# Patient Record
Sex: FEMALE | Race: WHITE | NOT HISPANIC OR LATINO
[De-identification: names, ages, dates, MRNs, and addresses within clinical notes are randomized per-mention and may not be internally consistent; named-entity substitution may affect disease eponyms.]

---

## 2017-01-20 ENCOUNTER — APPOINTMENT (OUTPATIENT)
Dept: NEUROSURGERY | Facility: CLINIC | Age: 82
End: 2017-01-20

## 2017-01-20 VITALS
HEART RATE: 72 BPM | SYSTOLIC BLOOD PRESSURE: 148 MMHG | HEIGHT: 63.5 IN | TEMPERATURE: 97.8 F | WEIGHT: 120 LBS | OXYGEN SATURATION: 98 % | DIASTOLIC BLOOD PRESSURE: 82 MMHG | BODY MASS INDEX: 21 KG/M2 | RESPIRATION RATE: 16 BRPM

## 2017-01-20 RX ORDER — SERTRALINE HYDROCHLORIDE 50 MG/1
50 TABLET, FILM COATED ORAL DAILY
Refills: 0 | Status: ACTIVE | COMMUNITY

## 2017-02-06 ENCOUNTER — APPOINTMENT (OUTPATIENT)
Dept: PHYSICAL MEDICINE AND REHAB | Facility: CLINIC | Age: 82
End: 2017-02-06

## 2017-02-06 DIAGNOSIS — Z74.09 OTHER REDUCED MOBILITY: ICD-10-CM

## 2017-02-06 DIAGNOSIS — I62.03 NONTRAUMATIC CHRONIC SUBDURAL HEMORRHAGE: ICD-10-CM

## 2017-02-10 ENCOUNTER — APPOINTMENT (OUTPATIENT)
Dept: OPHTHALMOLOGY | Facility: CLINIC | Age: 82
End: 2017-02-10

## 2017-02-15 PROBLEM — Z74.09 IMPAIRED FUNCTIONAL MOBILITY, BALANCE, AND ENDURANCE: Status: ACTIVE | Noted: 2017-02-15

## 2017-05-15 ENCOUNTER — APPOINTMENT (OUTPATIENT)
Dept: OPHTHALMOLOGY | Facility: CLINIC | Age: 82
End: 2017-05-15

## 2017-05-15 DIAGNOSIS — S05.02XA INJURY OF CONJUNCTIVA AND CORNEAL ABRASION W/OUT FOREIGN BODY, LEFT EYE, INITIAL ENCOUNTER: ICD-10-CM

## 2017-05-17 ENCOUNTER — APPOINTMENT (OUTPATIENT)
Dept: OPHTHALMOLOGY | Facility: CLINIC | Age: 82
End: 2017-05-17

## 2017-05-19 ENCOUNTER — APPOINTMENT (OUTPATIENT)
Dept: OPHTHALMOLOGY | Facility: CLINIC | Age: 82
End: 2017-05-19

## 2017-05-22 ENCOUNTER — RX RENEWAL (OUTPATIENT)
Age: 82
End: 2017-05-22

## 2017-05-24 ENCOUNTER — APPOINTMENT (OUTPATIENT)
Dept: OPHTHALMOLOGY | Facility: CLINIC | Age: 82
End: 2017-05-24

## 2017-06-02 ENCOUNTER — APPOINTMENT (OUTPATIENT)
Dept: OPHTHALMOLOGY | Facility: CLINIC | Age: 82
End: 2017-06-02

## 2017-06-15 ENCOUNTER — APPOINTMENT (OUTPATIENT)
Dept: DERMATOLOGY | Facility: CLINIC | Age: 82
End: 2017-06-15

## 2017-06-15 VITALS — WEIGHT: 128 LBS | BODY MASS INDEX: 21.85 KG/M2 | HEIGHT: 64 IN

## 2017-06-15 DIAGNOSIS — L81.4 OTHER MELANIN HYPERPIGMENTATION: ICD-10-CM

## 2017-06-15 DIAGNOSIS — Z87.891 PERSONAL HISTORY OF NICOTINE DEPENDENCE: ICD-10-CM

## 2017-06-15 DIAGNOSIS — Z78.9 OTHER SPECIFIED HEALTH STATUS: ICD-10-CM

## 2017-06-15 DIAGNOSIS — Z00.00 ENCOUNTER FOR GENERAL ADULT MEDICAL EXAMINATION W/OUT ABNORMAL FINDINGS: ICD-10-CM

## 2017-06-15 DIAGNOSIS — Z85.820 PERSONAL HISTORY OF MALIGNANT MELANOMA OF SKIN: ICD-10-CM

## 2017-06-15 DIAGNOSIS — S05.32XA OCULAR LACERATION W/OUT PROLAPSE OR LOSS OF INTRAOCULAR TISSUE, LEFT EYE, INITIAL ENCOUNTER: ICD-10-CM

## 2017-06-15 DIAGNOSIS — L82.1 OTHER SEBORRHEIC KERATOSIS: ICD-10-CM

## 2017-06-15 RX ORDER — ERYTHROMYCIN 5 MG/G
5 OINTMENT OPHTHALMIC
Qty: 1 | Refills: 3 | Status: DISCONTINUED | COMMUNITY
Start: 2017-05-15 | End: 2017-06-15

## 2017-06-15 RX ORDER — PREDNISOLONE ACETATE 10 MG/ML
1 SUSPENSION/ DROPS OPHTHALMIC
Qty: 11 | Refills: 1 | Status: DISCONTINUED | COMMUNITY
Start: 2017-06-02 | End: 2017-06-15

## 2017-06-15 RX ORDER — MOXIFLOXACIN HYDROCHLORIDE 5 MG/ML
0.5 SOLUTION/ DROPS OPHTHALMIC 4 TIMES DAILY
Qty: 1 | Refills: 2 | Status: DISCONTINUED | COMMUNITY
Start: 2017-05-15 | End: 2017-06-15

## 2017-06-16 ENCOUNTER — APPOINTMENT (OUTPATIENT)
Dept: OPHTHALMOLOGY | Facility: CLINIC | Age: 82
End: 2017-06-16

## 2017-06-19 ENCOUNTER — APPOINTMENT (OUTPATIENT)
Dept: OPHTHALMOLOGY | Facility: CLINIC | Age: 82
End: 2017-06-19

## 2017-07-14 ENCOUNTER — APPOINTMENT (OUTPATIENT)
Dept: OPHTHALMOLOGY | Facility: CLINIC | Age: 82
End: 2017-07-14

## 2017-07-14 DIAGNOSIS — H43.811 VITREOUS DEGENERATION, RIGHT EYE: ICD-10-CM

## 2017-08-11 ENCOUNTER — APPOINTMENT (OUTPATIENT)
Dept: OPHTHALMOLOGY | Facility: CLINIC | Age: 82
End: 2017-08-11
Payer: MEDICARE

## 2017-08-11 PROCEDURE — 92012 INTRM OPH EXAM EST PATIENT: CPT

## 2017-08-23 ENCOUNTER — RX RENEWAL (OUTPATIENT)
Age: 82
End: 2017-08-23

## 2017-08-25 ENCOUNTER — APPOINTMENT (OUTPATIENT)
Dept: OPHTHALMOLOGY | Facility: CLINIC | Age: 82
End: 2017-08-25
Payer: MEDICARE

## 2017-08-25 PROCEDURE — 92012 INTRM OPH EXAM EST PATIENT: CPT

## 2017-08-28 ENCOUNTER — APPOINTMENT (OUTPATIENT)
Dept: OPHTHALMOLOGY | Facility: CLINIC | Age: 82
End: 2017-08-28
Payer: MEDICARE

## 2017-08-28 PROCEDURE — 92012 INTRM OPH EXAM EST PATIENT: CPT

## 2017-08-31 ENCOUNTER — EMERGENCY (EMERGENCY)
Facility: HOSPITAL | Age: 82
LOS: 0 days | Discharge: HOME | End: 2017-08-31
Attending: EMERGENCY MEDICINE | Admitting: EMERGENCY MEDICINE
Payer: MEDICARE

## 2017-08-31 ENCOUNTER — APPOINTMENT (OUTPATIENT)
Dept: OPHTHALMOLOGY | Facility: CLINIC | Age: 82
End: 2017-08-31
Payer: MEDICARE

## 2017-08-31 VITALS
SYSTOLIC BLOOD PRESSURE: 149 MMHG | OXYGEN SATURATION: 99 % | DIASTOLIC BLOOD PRESSURE: 86 MMHG | TEMPERATURE: 98 F | HEART RATE: 69 BPM | WEIGHT: 130.07 LBS | HEIGHT: 64 IN

## 2017-08-31 DIAGNOSIS — Z90.89 ACQUIRED ABSENCE OF OTHER ORGANS: ICD-10-CM
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
## 2017-08-31 DIAGNOSIS — S40.819A ABRASION OF UNSPECIFIED UPPER ARM, INITIAL ENCOUNTER: ICD-10-CM

## 2017-08-31 DIAGNOSIS — Z94.7 CORNEAL TRANSPLANT STATUS: ICD-10-CM

## 2017-08-31 DIAGNOSIS — S50.811A ABRASION OF RIGHT FOREARM, INITIAL ENCOUNTER: ICD-10-CM

## 2017-08-31 DIAGNOSIS — H91.90 UNSPECIFIED HEARING LOSS, UNSPECIFIED EAR: ICD-10-CM

## 2017-08-31 DIAGNOSIS — M19.90 UNSPECIFIED OSTEOARTHRITIS, UNSPECIFIED SITE: ICD-10-CM
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
## 2017-08-31 DIAGNOSIS — I10 ESSENTIAL (PRIMARY) HYPERTENSION: ICD-10-CM

## 2017-08-31 DIAGNOSIS — H26.40 UNSPECIFIED SECONDARY CATARACT: ICD-10-CM

## 2017-08-31 DIAGNOSIS — Z96.651 PRESENCE OF RIGHT ARTIFICIAL KNEE JOINT: ICD-10-CM

## 2017-08-31 DIAGNOSIS — I35.0 NONRHEUMATIC AORTIC (VALVE) STENOSIS: ICD-10-CM

## 2017-08-31 PROCEDURE — 92012 INTRM OPH EXAM EST PATIENT: CPT

## 2017-08-31 PROCEDURE — 99283 EMERGENCY DEPT VISIT LOW MDM: CPT

## 2017-08-31 NOTE — ED STATDOCS - ATTENDING CONTRIBUTION TO CARE
I, Ivan Dc MD, personally saw the patient with the PA, and completed the key components of the history and physical exam. I then discussed the management plan with the PA.

## 2017-08-31 NOTE — ED STATDOCS - MEDICAL DECISION MAKING DETAILS
86 y/o F presents w/ right forearm skin tear s/p minor blunt injury, no associated neurovascular injury. Plan for local wound care. Family to check with PCP to confirm TD status up to date.

## 2017-08-31 NOTE — ED STATDOCS - PHYSICAL EXAMINATION
Right forearm nontender. Positive arthritic changes to right hand/finger joints. No focal weakness. Sensation intact.   Right forearm dorsal surface irregular superficial skin tear, no active bleeding.

## 2017-08-31 NOTE — ED STATDOCS - CONDUCTED A DETAILED DISCUSSION WITH PATIENT AND/OR GUARDIAN REGARDING, MDM
radiology results/need for outpatient follow-up need for outpatient follow-up/return to ED if symptoms worsen, persist or questions arise

## 2017-08-31 NOTE — ED STATDOCS - PROGRESS NOTE DETAILS
JABARI Contreras:   Patient has been seen, evaluated and orders have been written by the attending in intake. Patient is stable.  I will follow up the results of orders written and I will continue to evaluate/observe the patient.  Keysha Contreras PA-C Wound cleansed with H2O2 and Bacitracin dressing applied.  Keysha Contreras PA-C

## 2017-08-31 NOTE — ED STATDOCS - OBJECTIVE STATEMENT
84 y/o F w/ PMHx of presents to ED c/o R arm injury about 1 hour ago. Pt hit her arm on corner of door while coming out of bathroom at audiology office today.. Denies ASA or blood thinners, numbness, tingling, weakness s/p injury. As per family at bedside, believes last TD was less than 5 years ago.    MDM 84 y/o F presents w/ right forearm skin tear s/p minor blunt injury, no associated neurovascular injury. Plan for local wound care. Family to check with PCP to confirm TD status up to date.

## 2017-08-31 NOTE — ED STATDOCS - PMH
After cataract, right eye    Aortic Insufficiency    Arthritis    Hard of Hearing    HTN (Hypertension)    Hx of tonsillectomy    Post corneal transplant    Urinary urgency

## 2017-09-07 ENCOUNTER — APPOINTMENT (OUTPATIENT)
Dept: OPHTHALMOLOGY | Facility: CLINIC | Age: 82
End: 2017-09-07
Payer: MEDICARE

## 2017-09-07 DIAGNOSIS — H43.392 OTHER VITREOUS OPACITIES, LEFT EYE: ICD-10-CM

## 2017-09-07 DIAGNOSIS — S05.02XA INJURY OF CONJUNCTIVA AND CORNEAL ABRASION W/OUT FOREIGN BODY, LEFT EYE, INITIAL ENCOUNTER: ICD-10-CM

## 2017-09-07 PROCEDURE — 92132 CPTRZD OPH DX IMG ANT SGM: CPT

## 2017-09-07 PROCEDURE — 92012 INTRM OPH EXAM EST PATIENT: CPT

## 2017-09-07 PROCEDURE — 92285 EXTERNAL OCULAR PHOTOGRAPHY: CPT

## 2017-09-07 RX ORDER — ACYCLOVIR 400 MG/1
400 TABLET ORAL TWICE DAILY
Qty: 60 | Refills: 3 | Status: ACTIVE | COMMUNITY
Start: 2017-09-07 | End: 1900-01-01

## 2017-09-20 ENCOUNTER — APPOINTMENT (OUTPATIENT)
Dept: OPHTHALMOLOGY | Facility: CLINIC | Age: 82
End: 2017-09-20

## 2017-09-22 ENCOUNTER — APPOINTMENT (OUTPATIENT)
Dept: OPHTHALMOLOGY | Facility: CLINIC | Age: 82
End: 2017-09-22
Payer: MEDICARE

## 2017-09-22 DIAGNOSIS — B00.52 HERPESVIRAL KERATITIS: ICD-10-CM

## 2017-09-22 PROCEDURE — 92012 INTRM OPH EXAM EST PATIENT: CPT

## 2017-10-13 ENCOUNTER — OUTPATIENT (OUTPATIENT)
Dept: OUTPATIENT SERVICES | Facility: HOSPITAL | Age: 82
LOS: 1 days | End: 2017-10-13
Payer: MEDICARE

## 2017-10-13 ENCOUNTER — RESULT REVIEW (OUTPATIENT)
Age: 82
End: 2017-10-13

## 2017-10-13 ENCOUNTER — APPOINTMENT (OUTPATIENT)
Dept: RADIOLOGY | Facility: HOSPITAL | Age: 82
End: 2017-10-13
Payer: MEDICARE

## 2017-10-13 DIAGNOSIS — G31.84 MILD COGNITIVE IMPAIRMENT OF UNCERTAIN OR UNKNOWN ETIOLOGY: ICD-10-CM

## 2017-10-13 LAB
APPEARANCE CSF: CLEAR — SIGNIFICANT CHANGE UP
APPEARANCE SPUN FLD: COLORLESS — SIGNIFICANT CHANGE UP
COLOR CSF: SIGNIFICANT CHANGE UP
GLUCOSE CSF-MCNC: 64 MG/DL — SIGNIFICANT CHANGE UP (ref 40–70)
GRAM STN FLD: SIGNIFICANT CHANGE UP
NEUTROPHILS # CSF: 0 % — SIGNIFICANT CHANGE UP (ref 0–6)
NRBC NFR CSF: <1 — SIGNIFICANT CHANGE UP (ref 0–5)
PROT CSF-MCNC: 21 MG/DL — SIGNIFICANT CHANGE UP (ref 15–45)
RBC # CSF: 61 /UL — HIGH (ref 0–0)
SPECIMEN SOURCE: SIGNIFICANT CHANGE UP
TUBE TYPE: SIGNIFICANT CHANGE UP

## 2017-10-13 PROCEDURE — 88108 CYTOPATH CONCENTRATE TECH: CPT

## 2017-10-13 PROCEDURE — 77003 FLUOROGUIDE FOR SPINE INJECT: CPT | Mod: 26

## 2017-10-13 PROCEDURE — 87799 DETECT AGENT NOS DNA QUANT: CPT

## 2017-10-13 PROCEDURE — 77003 FLUOROGUIDE FOR SPINE INJECT: CPT

## 2017-10-13 PROCEDURE — 84166 PROTEIN E-PHORESIS/URINE/CSF: CPT

## 2017-10-13 PROCEDURE — 87070 CULTURE OTHR SPECIMN AEROBIC: CPT

## 2017-10-13 PROCEDURE — 82945 GLUCOSE OTHER FLUID: CPT

## 2017-10-13 PROCEDURE — 87476 LYME DIS DNA AMP PROBE: CPT

## 2017-10-13 PROCEDURE — 84157 ASSAY OF PROTEIN OTHER: CPT

## 2017-10-13 PROCEDURE — 62270 DX LMBR SPI PNXR: CPT

## 2017-10-13 PROCEDURE — 87205 SMEAR GRAM STAIN: CPT

## 2017-10-13 PROCEDURE — 88108 CYTOPATH CONCENTRATE TECH: CPT | Mod: 26

## 2017-10-13 PROCEDURE — 89051 BODY FLUID CELL COUNT: CPT

## 2017-10-16 LAB
CULTURE RESULTS: NO GROWTH — SIGNIFICANT CHANGE UP
NON-GYNECOLOGICAL CYTOLOGY STUDY: SIGNIFICANT CHANGE UP
SPECIMEN SOURCE: SIGNIFICANT CHANGE UP

## 2017-10-17 LAB
B BURGDOR DNA SPEC QL NAA+PROBE: NEGATIVE — SIGNIFICANT CHANGE UP
PROT CSF-MCNC: 21 MG/DL — SIGNIFICANT CHANGE UP (ref 15–45)
SOURCE VZV: SIGNIFICANT CHANGE UP
VZV DNA CSF NAA+PROBE-LOG#: SIGNIFICANT CHANGE UP
VZV PCR: SIGNIFICANT CHANGE UP

## 2017-10-25 LAB
ALBUMIN CSF-MCNC: 12 MG/DL — LOW (ref 14–25)
IGG CSF-MCNC: 1.3 MG/DL — SIGNIFICANT CHANGE UP
IGG SYNTH RATE SER+CSF CALC-MRATE: SIGNIFICANT CHANGE UP MG/DAY
IGG/ALB CLEAR SER+CSF-RTO: SIGNIFICANT CHANGE UP
IGG/ALB CSF: 0.11 RATIO — SIGNIFICANT CHANGE UP

## 2017-10-26 LAB
OLIGOCLONAL BANDS CSF ELPH-IMP: SIGNIFICANT CHANGE UP
OLIGOCLONAL BANDS CSF ELPH-IMP: SIGNIFICANT CHANGE UP

## 2017-11-01 LAB
MISCELLANEOUS TEST NAME: SIGNIFICANT CHANGE UP
MISCELLANEOUS, NORMALS: SIGNIFICANT CHANGE UP
MISCELLANEOUS, RESULT: SIGNIFICANT CHANGE UP

## 2017-11-06 ENCOUNTER — INPATIENT (INPATIENT)
Facility: HOSPITAL | Age: 82
LOS: 6 days | Discharge: HOME HEALTH | End: 2017-11-13
Attending: INTERNAL MEDICINE | Admitting: INTERNAL MEDICINE
Payer: MEDICARE

## 2017-11-06 VITALS
WEIGHT: 149.91 LBS | HEART RATE: 98 BPM | HEIGHT: 67 IN | RESPIRATION RATE: 16 BRPM | SYSTOLIC BLOOD PRESSURE: 126 MMHG | TEMPERATURE: 98 F | OXYGEN SATURATION: 97 % | DIASTOLIC BLOOD PRESSURE: 90 MMHG

## 2017-11-06 LAB
ALBUMIN SERPL ELPH-MCNC: 4.1 G/DL — SIGNIFICANT CHANGE UP (ref 3.3–5)
ALP SERPL-CCNC: 83 U/L — SIGNIFICANT CHANGE UP (ref 40–120)
ALT FLD-CCNC: 23 U/L — SIGNIFICANT CHANGE UP (ref 12–78)
ANION GAP SERPL CALC-SCNC: 10 MMOL/L — SIGNIFICANT CHANGE UP (ref 5–17)
APTT BLD: 28.2 SEC — SIGNIFICANT CHANGE UP (ref 27.5–37.4)
AST SERPL-CCNC: 44 U/L — HIGH (ref 15–37)
BASOPHILS # BLD AUTO: 0.1 K/UL — SIGNIFICANT CHANGE UP (ref 0–0.2)
BASOPHILS NFR BLD AUTO: 0.8 % — SIGNIFICANT CHANGE UP (ref 0–2)
BILIRUB SERPL-MCNC: 0.6 MG/DL — SIGNIFICANT CHANGE UP (ref 0.2–1.2)
BUN SERPL-MCNC: 23 MG/DL — SIGNIFICANT CHANGE UP (ref 7–23)
CALCIUM SERPL-MCNC: 9.7 MG/DL — SIGNIFICANT CHANGE UP (ref 8.5–10.1)
CHLORIDE SERPL-SCNC: 107 MMOL/L — SIGNIFICANT CHANGE UP (ref 96–108)
CO2 SERPL-SCNC: 25 MMOL/L — SIGNIFICANT CHANGE UP (ref 22–31)
CREAT SERPL-MCNC: 1.07 MG/DL — SIGNIFICANT CHANGE UP (ref 0.5–1.3)
EOSINOPHIL # BLD AUTO: 0.1 K/UL — SIGNIFICANT CHANGE UP (ref 0–0.5)
EOSINOPHIL NFR BLD AUTO: 0.9 % — SIGNIFICANT CHANGE UP (ref 0–6)
GLUCOSE SERPL-MCNC: 90 MG/DL — SIGNIFICANT CHANGE UP (ref 70–99)
HCT VFR BLD CALC: 38.7 % — SIGNIFICANT CHANGE UP (ref 34.5–45)
HGB BLD-MCNC: 13.1 G/DL — SIGNIFICANT CHANGE UP (ref 11.5–15.5)
INR BLD: 1.06 RATIO — SIGNIFICANT CHANGE UP (ref 0.88–1.16)
LYMPHOCYTES # BLD AUTO: 1.6 K/UL — SIGNIFICANT CHANGE UP (ref 1–3.3)
LYMPHOCYTES # BLD AUTO: 18.2 % — SIGNIFICANT CHANGE UP (ref 13–44)
MCHC RBC-ENTMCNC: 32 PG — SIGNIFICANT CHANGE UP (ref 27–34)
MCHC RBC-ENTMCNC: 33.8 GM/DL — SIGNIFICANT CHANGE UP (ref 32–36)
MCV RBC AUTO: 94.7 FL — SIGNIFICANT CHANGE UP (ref 80–100)
MONOCYTES # BLD AUTO: 0.7 K/UL — SIGNIFICANT CHANGE UP (ref 0–0.9)
MONOCYTES NFR BLD AUTO: 8.2 % — SIGNIFICANT CHANGE UP (ref 2–14)
NEUTROPHILS # BLD AUTO: 6.4 K/UL — SIGNIFICANT CHANGE UP (ref 1.8–7.4)
NEUTROPHILS NFR BLD AUTO: 71.9 % — SIGNIFICANT CHANGE UP (ref 43–77)
PLATELET # BLD AUTO: 258 K/UL — SIGNIFICANT CHANGE UP (ref 150–400)
POTASSIUM SERPL-MCNC: 3.8 MMOL/L — SIGNIFICANT CHANGE UP (ref 3.5–5.3)
POTASSIUM SERPL-SCNC: 3.8 MMOL/L — SIGNIFICANT CHANGE UP (ref 3.5–5.3)
PROT SERPL-MCNC: 7.9 GM/DL — SIGNIFICANT CHANGE UP (ref 6–8.3)
PROTHROM AB SERPL-ACNC: 11.5 SEC — SIGNIFICANT CHANGE UP (ref 9.8–12.7)
RBC # BLD: 4.09 M/UL — SIGNIFICANT CHANGE UP (ref 3.8–5.2)
RBC # FLD: 12 % — SIGNIFICANT CHANGE UP (ref 10.3–14.5)
SODIUM SERPL-SCNC: 142 MMOL/L — SIGNIFICANT CHANGE UP (ref 135–145)
WBC # BLD: 9 K/UL — SIGNIFICANT CHANGE UP (ref 3.8–10.5)
WBC # FLD AUTO: 9 K/UL — SIGNIFICANT CHANGE UP (ref 3.8–10.5)

## 2017-11-06 PROCEDURE — 70450 CT HEAD/BRAIN W/O DYE: CPT | Mod: 26

## 2017-11-06 PROCEDURE — 71010: CPT | Mod: 26

## 2017-11-06 PROCEDURE — 99285 EMERGENCY DEPT VISIT HI MDM: CPT

## 2017-11-06 RX ORDER — INFLUENZA VIRUS VACCINE 15; 15; 15; 15 UG/.5ML; UG/.5ML; UG/.5ML; UG/.5ML
0.5 SUSPENSION INTRAMUSCULAR ONCE
Qty: 0 | Refills: 0 | Status: DISCONTINUED | OUTPATIENT
Start: 2017-11-06 | End: 2017-11-07

## 2017-11-06 RX ORDER — ONDANSETRON 8 MG/1
4 TABLET, FILM COATED ORAL EVERY 6 HOURS
Qty: 0 | Refills: 0 | Status: DISCONTINUED | OUTPATIENT
Start: 2017-11-06 | End: 2017-11-13

## 2017-11-06 RX ORDER — HALOPERIDOL DECANOATE 100 MG/ML
1 INJECTION INTRAMUSCULAR EVERY 4 HOURS
Qty: 0 | Refills: 0 | Status: DISCONTINUED | OUTPATIENT
Start: 2017-11-06 | End: 2017-11-13

## 2017-11-06 RX ORDER — PREDNISOLONE SODIUM PHOSPHATE 1 %
1 DROPS OPHTHALMIC (EYE)
Qty: 0 | Refills: 0 | Status: DISCONTINUED | OUTPATIENT
Start: 2017-11-06 | End: 2017-11-13

## 2017-11-06 RX ORDER — TRAZODONE HCL 50 MG
50 TABLET ORAL AT BEDTIME
Qty: 0 | Refills: 0 | Status: DISCONTINUED | OUTPATIENT
Start: 2017-11-06 | End: 2017-11-13

## 2017-11-06 RX ORDER — ACETAMINOPHEN 500 MG
650 TABLET ORAL EVERY 6 HOURS
Qty: 0 | Refills: 0 | Status: DISCONTINUED | OUTPATIENT
Start: 2017-11-06 | End: 2017-11-13

## 2017-11-06 RX ORDER — LIPASE/PROTEASE/AMYLASE 16-48-48K
2 CAPSULE,DELAYED RELEASE (ENTERIC COATED) ORAL
Qty: 0 | Refills: 0 | Status: DISCONTINUED | OUTPATIENT
Start: 2017-11-06 | End: 2017-11-13

## 2017-11-06 RX ORDER — AMLODIPINE BESYLATE 2.5 MG/1
5 TABLET ORAL DAILY
Qty: 0 | Refills: 0 | Status: DISCONTINUED | OUTPATIENT
Start: 2017-11-06 | End: 2017-11-13

## 2017-11-06 RX ORDER — LANOLIN ALCOHOL/MO/W.PET/CERES
10 CREAM (GRAM) TOPICAL AT BEDTIME
Qty: 0 | Refills: 0 | Status: DISCONTINUED | OUTPATIENT
Start: 2017-11-06 | End: 2017-11-06

## 2017-11-06 RX ORDER — DIVALPROEX SODIUM 500 MG/1
500 TABLET, DELAYED RELEASE ORAL ONCE
Qty: 0 | Refills: 0 | Status: COMPLETED | OUTPATIENT
Start: 2017-11-06 | End: 2017-11-06

## 2017-11-06 RX ORDER — SERTRALINE 25 MG/1
100 TABLET, FILM COATED ORAL DAILY
Qty: 0 | Refills: 0 | Status: DISCONTINUED | OUTPATIENT
Start: 2017-11-06 | End: 2017-11-13

## 2017-11-06 RX ORDER — PANTOPRAZOLE SODIUM 20 MG/1
40 TABLET, DELAYED RELEASE ORAL
Qty: 0 | Refills: 0 | Status: DISCONTINUED | OUTPATIENT
Start: 2017-11-06 | End: 2017-11-13

## 2017-11-06 RX ORDER — LANOLIN ALCOHOL/MO/W.PET/CERES
9 CREAM (GRAM) TOPICAL AT BEDTIME
Qty: 0 | Refills: 0 | Status: DISCONTINUED | OUTPATIENT
Start: 2017-11-06 | End: 2017-11-13

## 2017-11-06 RX ORDER — QUETIAPINE FUMARATE 200 MG/1
25 TABLET, FILM COATED ORAL EVERY 6 HOURS
Qty: 0 | Refills: 0 | Status: DISCONTINUED | OUTPATIENT
Start: 2017-11-06 | End: 2017-11-08

## 2017-11-06 RX ORDER — HALOPERIDOL DECANOATE 100 MG/ML
2.5 INJECTION INTRAMUSCULAR ONCE
Qty: 0 | Refills: 0 | Status: COMPLETED | OUTPATIENT
Start: 2017-11-06 | End: 2017-11-06

## 2017-11-06 RX ORDER — SODIUM CHLORIDE 9 MG/ML
1000 INJECTION INTRAMUSCULAR; INTRAVENOUS; SUBCUTANEOUS ONCE
Qty: 0 | Refills: 0 | Status: COMPLETED | OUTPATIENT
Start: 2017-11-06 | End: 2017-11-06

## 2017-11-06 RX ORDER — HEPARIN SODIUM 5000 [USP'U]/ML
5000 INJECTION INTRAVENOUS; SUBCUTANEOUS EVERY 12 HOURS
Qty: 0 | Refills: 0 | Status: DISCONTINUED | OUTPATIENT
Start: 2017-11-06 | End: 2017-11-13

## 2017-11-06 RX ADMIN — Medication 1 MILLIGRAM(S): at 15:54

## 2017-11-06 RX ADMIN — DIVALPROEX SODIUM 500 MILLIGRAM(S): 500 TABLET, DELAYED RELEASE ORAL at 20:22

## 2017-11-06 RX ADMIN — HALOPERIDOL DECANOATE 2.5 MILLIGRAM(S): 100 INJECTION INTRAMUSCULAR at 15:54

## 2017-11-06 RX ADMIN — SODIUM CHLORIDE 1000 MILLILITER(S): 9 INJECTION INTRAMUSCULAR; INTRAVENOUS; SUBCUTANEOUS at 15:54

## 2017-11-06 NOTE — H&P ADULT - HISTORY OF PRESENT ILLNESS
84 y/o F with PMHx of HTN, Lyme's disease, babesiosis and ehrlichiosis presents to the ED with two daughters and aide at bedside for AMS. On June 18, 2016, pt was diagnosed with the 3 tick borne diseases. Pt was in ICU, treated with quinine. hematoma.  Pt got better and then went [place] rehab. Pt was brought home on July 16, 2016. Since pt has been home, pt has been fine with some cognitive deficits according to daughter. Pt sees Dr. Thayer for psychiatric care and was placed on Seroquel 25mg 5 times a day. This past weekend, pt's daughter states that pt is more agitated. Pt has also not been sleeping. Pt had EEG and labs done on Thursday to test for the tick borne illnesses. Pt had UA done and it was unremarkable. Pt also MRI done 3 weeks ago which was normal. Aide states that pt has been more difficult to feed. Dr. Thayer, psychiatrist. Pt had abrupt changes in her MS not consistent with worsening dementia; daughter thinks this is neurolyme; I will consult psych for better med management and ID re: poss neurolyme

## 2017-11-06 NOTE — H&P ADULT - ASSESSMENT
Delirium  - pt with no h/o cognitive impairment very educated and active who had major set back after infection with all 3 tick born ilnesses  - it is very possible that this could be sec to the prev infection, and not impossible neurolyme  - consult psych re: drug regimen to allow her to return home and ID re: poss neurolyme  - for now I will use ativan, prn; pt needs 1:1 she is very aggressive when awke at risk to harm self and others  Case d/w daughter  Hanny is not formulary fam to bring from home

## 2017-11-06 NOTE — ED ADULT NURSE NOTE - OBJECTIVE STATEMENT
Pt biba for increased confusion and ams. Daughter reported that mom   was stricken with tick borne diseases x 2 in June 2016. Pt has been altered since then and required 24 hr  care.

## 2017-11-06 NOTE — ED PROVIDER NOTE - MEDICAL DECISION MAKING DETAILS
Pt with AMS for the past 2 years, worsening this past weekend despite psychiatrist adjusting Seroquel dose. Plan for labs, UA and CT to r/o acute process.

## 2017-11-06 NOTE — H&P ADULT - NSHPPHYSICALEXAM_GEN_ALL_CORE
· CONSTITUTIONAL: somnolent s/p haldol and ativan  · ENMT: Airway patent, Nasal mucosa clear. Mouth with normal mucosa.   · HEAD: Head atraumatic, normal cephalic shape.  · EYES: Clear bilaterally, pupils equal, round and reactive to light. Dry oral mucosae  · CARDIAC: Normal rate, regular rhythm.  Heart sounds S1, S2.  No murmurs, rubs or gallops.  · RESPIRATORY: Breath sounds clear and equal bilaterally.  · GASTROINTESTINAL: Abdomen soft, non-tender, no guarding.  · MUSCULOSKELETAL: Spine appears normal, range of motion is not limited, no muscle or joint tenderness. Moves all extremities.  · NEUROLOGICAL: Pt confused, disoriented and unaware.  · SKIN: Skin normal color for race, warm, dry and intact. No evidence of rash.  · PSYCHIATRIC: pt confused, disoriented and unaware earlier; now she is sedated

## 2017-11-06 NOTE — PATIENT PROFILE ADULT. - ABILITY TO HEAR (WITH HEARING AID OR HEARING APPLIANCE IF NORMALLY USED):
rudy hearing aids at home/Mildly to Moderately Impaired: difficulty hearing in some environments or speaker may need to increase volume or speak distinctly

## 2017-11-06 NOTE — ED PROVIDER NOTE - MUSCULOSKELETAL, MLM
Spine appears normal, range of motion is not limited, no muscle or joint tenderness. Moves all extremities.

## 2017-11-06 NOTE — ED ADULT NURSE REASSESSMENT NOTE - NS ED NURSE REASSESS COMMENT FT1
Daughter refused to have her mom straight cath at this time  because she had ua last week that was fine

## 2017-11-06 NOTE — ED ADULT TRIAGE NOTE - CHIEF COMPLAINT QUOTE
pt with "2 tick born diseases now with altered mental status" pt alert but speaking in jargon at triage.

## 2017-11-06 NOTE — H&P ADULT - NSHPLABSRESULTS_GEN_ALL_CORE
13.1   9.0   )-----------( 258      ( 06 Nov 2017 15:38 )             38.7   11-06    142  |  107  |  23  ----------------------------<  90  3.8   |  25  |  1.07    Ca    9.7      06 Nov 2017 15:38    TPro  7.9  /  Alb  4.1  /  TBili  0.6  /  DBili  x   /  AST  44<H>  /  ALT  23  /  AlkPhos  83  11-06

## 2017-11-06 NOTE — ED PROVIDER NOTE - OBJECTIVE STATEMENT
84 y/o F with PMHx of HTN, Lyme's disease, babesiosis and ehrlichiosis presents to the ED with two daughters and aide at bedside for AMS. On June 18, 2016, pt was diagnosed with the 3 tick borne diseases. Pt was in ICU, treated with quinine. While in ICU pt had subdermal hematoma. Pt was then treated at Neurosurgery Department at [facility]. Pt was treated with steroids. Pt got better and then went [place] rehab. Pt was brought home on July 16, 2016. Since pt has been home, pt has been fine with some cognitive deficits according to daughter. Pt sees Dr. Purvis for psychiatric care and was placed on Seroquel 25mg 5 times a day. This past weekend, pt's daughter states that pt is more agitated. Pt has also not been sleeping. Pt had EEG and labs done on Thursday to test for the tick borne illnesses. Pt had UA done and it was unremarkable. Pt also MRI done 3 weeks ago which was normal. Aide states that pt has been more difficult to feed. Dr. Purvis, psychiatrist. 86 y/o F with PMHx of HTN, Lyme's disease, babesiosis and ehrlichiosis presents to the ED with two daughters and aide at bedside for AMS. On June 18, 2016, pt was diagnosed with the 3 tick borne diseases. Pt was in ICU, treated with quinine. While in ICU pt had subdermal hematoma. Pt was then treated at Neurosurgery Department at [facility]. Pt was treated with steroids. Pt got better and then went [place] re[facility]. Pt was brought home on July 16, 2016. Since pt has been home, pt has been fine with some cognitive deficits according to daughter. Pt sees Dr. Thayer for psychiatric care and was placed on Seroquel 25mg 5 times a day. This past weekend, pt's daughter states that pt is more agitated. Pt has also not been sleeping. Pt had EEG and labs done on Thursday to test for the tick borne illnesses. Pt had UA done and it was unremarkable. Pt also MRI done 3 weeks ago which was normal. Aide states that pt has been more difficult to feed. Dr. Thayer, psychiatrist.

## 2017-11-06 NOTE — ED PROVIDER NOTE - CARE PLAN
Principal Discharge DX:	Altered mental status  Secondary Diagnosis:	Dementia  Secondary Diagnosis:	Agitation

## 2017-11-06 NOTE — PATIENT PROFILE ADULT. - VISION (WITH CORRECTIVE LENSES IF THE PATIENT USUALLY WEARS THEM):
Partially impaired: cannot see medication labels or newsprint, but can see obstacles in path, and the surrounding layout; can count fingers at arm's length/distance/glasses

## 2017-11-07 DIAGNOSIS — F02.81 DEMENTIA IN OTHER DISEASES CLASSIFIED ELSEWHERE, UNSPECIFIED SEVERITY, WITH BEHAVIORAL DISTURBANCE: ICD-10-CM

## 2017-11-07 RX ADMIN — Medication 1 DROP(S): at 02:38

## 2017-11-07 RX ADMIN — Medication 1 DROP(S): at 05:40

## 2017-11-07 RX ADMIN — QUETIAPINE FUMARATE 25 MILLIGRAM(S): 200 TABLET, FILM COATED ORAL at 02:39

## 2017-11-07 RX ADMIN — Medication 2 CAPSULE(S): at 12:55

## 2017-11-07 RX ADMIN — QUETIAPINE FUMARATE 25 MILLIGRAM(S): 200 TABLET, FILM COATED ORAL at 05:44

## 2017-11-07 RX ADMIN — Medication 1 DROP(S): at 17:00

## 2017-11-07 RX ADMIN — HEPARIN SODIUM 5000 UNIT(S): 5000 INJECTION INTRAVENOUS; SUBCUTANEOUS at 05:39

## 2017-11-07 RX ADMIN — QUETIAPINE FUMARATE 25 MILLIGRAM(S): 200 TABLET, FILM COATED ORAL at 17:00

## 2017-11-07 RX ADMIN — PANTOPRAZOLE SODIUM 40 MILLIGRAM(S): 20 TABLET, DELAYED RELEASE ORAL at 05:44

## 2017-11-07 RX ADMIN — Medication 2 CAPSULE(S): at 08:13

## 2017-11-07 RX ADMIN — Medication 1 DROP(S): at 12:55

## 2017-11-07 RX ADMIN — AMLODIPINE BESYLATE 5 MILLIGRAM(S): 2.5 TABLET ORAL at 05:40

## 2017-11-07 RX ADMIN — Medication 2 CAPSULE(S): at 17:31

## 2017-11-07 RX ADMIN — SERTRALINE 100 MILLIGRAM(S): 25 TABLET, FILM COATED ORAL at 12:55

## 2017-11-07 RX ADMIN — QUETIAPINE FUMARATE 25 MILLIGRAM(S): 200 TABLET, FILM COATED ORAL at 12:55

## 2017-11-07 RX ADMIN — Medication 9 MILLIGRAM(S): at 02:38

## 2017-11-07 NOTE — BEHAVIORAL HEALTH ASSESSMENT NOTE - NSBHREFERDETAILS_PSY_A_CORE_FT
· Chief Complaint: The patient is a 85y Female complaining of altered mental status.  · HPI Objective Statement: 84 y/o F with PMHx of HTN, Lyme's disease, babesiosis and ehrlichiosis presents to the ED with two daughters and aide at bedside for AMS. On June 18, 2016, pt was diagnosed with the 3 tick borne diseases. Pt was in ICU, treated with quinine. While in ICU pt had subdermal hematoma. Pt was then treated at Neurosurgery Department at [facility]. Pt was treated with steroids. Pt got better and then went [place] rehab. Pt was brought home on July 16, 2016. Since pt has been home, pt has been fine with some cognitive deficits according to daughter. Pt sees Dr. Thayer for psychiatric care and was placed on Seroquel 25mg 5 times a day. This past weekend, pt's daughter states that pt is more agitated. Pt has also not been sleeping. Pt had EEG and labs done on Thursday to test for the tick borne illnesses. Pt had UA done and it was unremarkable. Pt also MRI done 3 weeks ago which was normal. Aide states that pt has been more difficult to feed. Dr. Thayer, psychiatrist.

## 2017-11-07 NOTE — CONSULT NOTE ADULT - SUBJECTIVE AND OBJECTIVE BOX
Patient is a 85y old  Female who presents with a chief complaint of AMS (06 Nov 2017 20:14)    HPI:  86 y/o Female with h/o HTN, Lyme's disease + babesiosis + ehrlichiosis (all in June 2016, treated with doxycycline PO, clindamycin + quinine, then atovaquone + azithromycin and RBC exchange transfusion) was admitted on 11/6 with two daughters and aide at bedside for AMS. Over last week PTA, pt's daughter states that pt was more agitated wit hinsomnia. Pt had EEG and labs done on Thursday to test for the tick borne illnesses.  An MRI head done 3 weeks PTA was reported normal. Aide states that pt has been more difficult to feed. Her psychiatrist felt that her abrupt changes in her MS are not consistent with worsening dementia and is concerned about neurological Lyme disease.      On June 18, 2016, pt was diagnosed with the 3 tick borne diseases. Pt was in ICU, treated with quinine. hematoma.  Pt got better and then went [place] re[facility]. Pt was brought home on July 16, 2016. Since pt has been home, pt has been fine with some cognitive deficits according to daughter. Pt sees Dr. Thayer for psychiatric care and was placed on Seroquel 25mg 5 times a day.       PMH: as above  PSH: as above  Meds: per reconciliation sheet, noted below  MEDICATIONS  (STANDING):  amLODIPine   Tablet 5 milliGRAM(s) Oral daily  amylase/lipase/protease  (CREON 12,000 Units) 2 Capsule(s) Oral three times a day with meals  heparin  Injectable 5000 Unit(s) SubCutaneous every 12 hours  melatonin 9 milliGRAM(s) Oral at bedtime  pantoprazole    Tablet 40 milliGRAM(s) Oral before breakfast  prednisoLONE acetate 1% Suspension 1 Drop(s) Left EYE four times a day  QUEtiapine 25 milliGRAM(s) Oral every 6 hours  sertraline 100 milliGRAM(s) Oral daily  traZODone 50 milliGRAM(s) Oral at bedtime    MEDICATIONS  (PRN):  acetaminophen   Tablet 650 milliGRAM(s) Oral every 6 hours PRN fever pain  haloperidol    Injectable 1 milliGRAM(s) IntraMuscular every 4 hours PRN agitation  LORazepam   Injectable 1 milliGRAM(s) IntraMuscular every 6 hours PRN Agitation  ondansetron Injectable 4 milliGRAM(s) IV Push every 6 hours PRN Nausea    Allergies    No Known Allergies    Intolerances      Social: no smoking, no alcohol, no illegal drugs; no recent travel, no exposure to TB  FAMILY HISTORY:  No pertinent family history in first degree relatives    ROS: the patient dis confused; unobtainable    Vital Signs Last 24 Hrs  T(C): 37.2 (07 Nov 2017 11:06), Max: 37.2 (07 Nov 2017 11:06)  T(F): 98.9 (07 Nov 2017 11:06), Max: 98.9 (07 Nov 2017 11:06)  HR: 69 (07 Nov 2017 11:06) (69 - 98)  BP: 113/71 (07 Nov 2017 11:06) (113/71 - 152/72)  BP(mean): --  RR: 17 (07 Nov 2017 11:06) (16 - 17)  SpO2: 98% (07 Nov 2017 11:06) (97% - 98%)  Daily Height in cm: 170.18 (06 Nov 2017 14:32)    Daily     PE:    Constitutional: frail looking  HEENT: NC/AT, EOMI, PERRLA  Neck: supple  Back: no tenderness  Respiratory: clear  Cardiovascular: S1S2 regular, no murmurs  Abdomen: soft, not tender, not distended, positive BS  Genitourinary: no LN palpable  Rectal: deferred  Musculoskeletal: no muscle tenderness, no joint swelling or tenderness  Extremities: no pedal edema  Neurological: confused, moving all extremities  Skin: no rashes    Labs:                        13.1   9.0   )-----------( 258      ( 06 Nov 2017 15:38 )             38.7     11-06    142  |  107  |  23  ----------------------------<  90  3.8   |  25  |  1.07    Ca    9.7      06 Nov 2017 15:38    TPro  7.9  /  Alb  4.1  /  TBili  0.6  /  DBili  x   /  AST  44<H>  /  ALT  23  /  AlkPhos  83  11-06     LIVER FUNCTIONS - ( 06 Nov 2017 15:38 )  Alb: 4.1 g/dL / Pro: 7.9 gm/dL / ALK PHOS: 83 U/L / ALT: 23 U/L / AST: 44 U/L / GGT: x           June 2016: Lyme Ig G positive; Lyme IgM negative      Radiology:    < from: CT Head No Cont (11.06.17 @ 16:17) >  Chronic small vessel ischemic changes. No parenchymal contusion,   hemorrhage or extra-axial collection.    No CT evidence of an acute territorial infarction.    < end of copied text >      Advanced directives addressed: full resuscitation Patient is a 85y old  Female who presents with a chief complaint of AMS (06 Nov 2017 20:14)    HPI:  84 y/o Female with h/o HTN, Lyme's disease + babesiosis + ehrlichiosis (all in June 2016, treated with doxycycline PO, clindamycin + quinine, then atovaquone + azithromycin and RBC exchange transfusion) was admitted on 11/6 with two daughters and aide at bedside for AMS. Over last week PTA, pt's daughter states that pt was more agitated wit hinsomnia. Pt had EEG and labs done on Thursday to test for the tick borne illnesses.  An MRI head done 3 weeks PTA was reported normal. Aide states that pt has been more difficult to feed. Her psychiatrist felt that her abrupt changes in her MS are not consistent with worsening dementia and is concerned about neurological Lyme disease.      On June 18, 2016, pt was diagnosed with the 3 tick borne diseases. Pt was in ICU, treated with quinine. hematoma.  Pt got better and then went [place] rehab. Pt was brought home on July 16, 2016. Since pt has been home, pt has been fine with some cognitive deficits according to daughter. Pt sees Dr. Thayer for psychiatric care and was placed on Seroquel 25mg 5 times a day.       PMH: as above  PSH: as above  Meds: per reconciliation sheet, noted below  MEDICATIONS  (STANDING):  amLODIPine   Tablet 5 milliGRAM(s) Oral daily  amylase/lipase/protease  (CREON 12,000 Units) 2 Capsule(s) Oral three times a day with meals  heparin  Injectable 5000 Unit(s) SubCutaneous every 12 hours  melatonin 9 milliGRAM(s) Oral at bedtime  pantoprazole    Tablet 40 milliGRAM(s) Oral before breakfast  prednisoLONE acetate 1% Suspension 1 Drop(s) Left EYE four times a day  QUEtiapine 25 milliGRAM(s) Oral every 6 hours  sertraline 100 milliGRAM(s) Oral daily  traZODone 50 milliGRAM(s) Oral at bedtime    MEDICATIONS  (PRN):  acetaminophen   Tablet 650 milliGRAM(s) Oral every 6 hours PRN fever pain  haloperidol    Injectable 1 milliGRAM(s) IntraMuscular every 4 hours PRN agitation  LORazepam   Injectable 1 milliGRAM(s) IntraMuscular every 6 hours PRN Agitation  ondansetron Injectable 4 milliGRAM(s) IV Push every 6 hours PRN Nausea    Allergies    No Known Allergies    Intolerances      Social: no smoking, no alcohol, no illegal drugs; no recent travel, no exposure to TB  FAMILY HISTORY:  No pertinent family history in first degree relatives    ROS: the patient dis confused; unobtainable    Vital Signs Last 24 Hrs  T(C): 37.2 (07 Nov 2017 11:06), Max: 37.2 (07 Nov 2017 11:06)  T(F): 98.9 (07 Nov 2017 11:06), Max: 98.9 (07 Nov 2017 11:06)  HR: 69 (07 Nov 2017 11:06) (69 - 98)  BP: 113/71 (07 Nov 2017 11:06) (113/71 - 152/72)  BP(mean): --  RR: 17 (07 Nov 2017 11:06) (16 - 17)  SpO2: 98% (07 Nov 2017 11:06) (97% - 98%)  Daily Height in cm: 170.18 (06 Nov 2017 14:32)    Daily     PE:    Constitutional: frail looking  HEENT: NC/AT, EOMI, PERRLA  Neck: supple  Back: no tenderness  Respiratory: clear  Cardiovascular: S1S2 regular, no murmurs  Abdomen: soft, not tender, not distended, positive BS  Genitourinary: no LN palpable  Rectal: deferred  Musculoskeletal: no muscle tenderness, no joint swelling or tenderness  Extremities: no pedal edema  Neurological: confused, moving all extremities  Skin: no rashes    Labs:                        13.1   9.0   )-----------( 258      ( 06 Nov 2017 15:38 )             38.7     11-06    142  |  107  |  23  ----------------------------<  90  3.8   |  25  |  1.07    Ca    9.7      06 Nov 2017 15:38    TPro  7.9  /  Alb  4.1  /  TBili  0.6  /  DBili  x   /  AST  44<H>  /  ALT  23  /  AlkPhos  83  11-06     LIVER FUNCTIONS - ( 06 Nov 2017 15:38 )  Alb: 4.1 g/dL / Pro: 7.9 gm/dL / ALK PHOS: 83 U/L / ALT: 23 U/L / AST: 44 U/L / GGT: x           June 2016: Lyme Ig G positive; Lyme IgM negative  Nov 2, 2017 Lyme IgG positive; Lyme IgM negative; no change in AB pattern from June 2016    Radiology:    < from: CT Head No Cont (11.06.17 @ 16:17) >  Chronic small vessel ischemic changes. No parenchymal contusion,   hemorrhage or extra-axial collection.    No CT evidence of an acute territorial infarction.    < end of copied text >      Advanced directives addressed: full resuscitation

## 2017-11-07 NOTE — PROGRESS NOTE ADULT - SUBJECTIVE AND OBJECTIVE BOX
86 y/o F with PMHx of HTN, Lyme's disease, babesiosis and ehrlichiosis presents to the ED with two daughters and aide at bedside for AMS. On June 18, 2016, pt was diagnosed with the 3 tick borne diseases. Pt was in ICU, treated with quinine. hematoma.  Pt got better and then went [place] [facility]. Pt was brought home on July 16, 2016. Since pt has been home, pt has been fine with some cognitive deficits according to daughter. Pt sees Dr. Thayer for psychiatric care and was placed on Seroquel 25mg 5 times a day. This past weekend, pt's daughter states that pt is more agitated. Pt has also not been sleeping. Pt had EEG and labs done on Thursday to test for the tick borne illnesses. Pt had UA done and it was unremarkable. Pt also MRI done 3 weeks ago which was normal. Aide states that pt has been more difficult to feed. Dr. Thayer, psychiatrist. Pt had abrupt changes in her MS not consistent with worsening dementia; daughter thinks this is neurolyme; I will consult psych for better med management and ID re: poss     11/7: Mrs. Bhagat is v calm today and nursing had no events to report; appropriate, pleasant, remembers why she is here and acknowledges she is been having a hard time with delirium and hallucinations    Vital Signs Last 24 Hrs  T(C): 37.2 (07 Nov 2017 11:06), Max: 37.2 (07 Nov 2017 11:06)  T(F): 98.9 (07 Nov 2017 11:06), Max: 98.9 (07 Nov 2017 11:06)  HR: 69 (07 Nov 2017 11:06) (69 - 98)  BP: 113/71 (07 Nov 2017 11:06) (113/71 - 152/72)  BP(mean): --  RR: 17 (07 Nov 2017 11:06) (16 - 17)  SpO2: 98% (07 Nov 2017 11:06) (97% - 98%)    Physical Exam: ·  	· ENMT: Airway patent, Nasal mucosa clear. Mouth with normal mucosa.   	· HEAD: Head atraumatic, normal cephalic shape.  	· EYES: Clear bilaterally, pupils equal, round and reactive to light. Dry oral mucosae  	· CARDIAC: Normal rate, regular rhythm.  Heart sounds S1, S2.  No murmurs, rubs or gallops.  	· RESPIRATORY: Breath sounds clear and equal bilaterally.  	· GASTROINTESTINAL: Abdomen soft, non-tender, no guarding.  	· MUSCULOSKELETAL: Spine appears normal, range of motion is not limited, no muscle or joint tenderness. Moves all extremities.  	· NEUROLOGICAL: awake, alert and oriented; mild tremor upper extr- per pt chronic; ambulating to bathroom with no deficit  	· SKIN: Skin normal color for race, warm, dry and intact. No evidence of rash.      A/P:    Delirium  - pt with no h/o cognitive impairment very educated and active who had major set back after infection with all 3 tick born illnesses  - it is very possible that this could be sec to the prev infection, and not impossible neurolyme  - consult psych re: drug regimen to allow her to return home and ID re: poss neurolyme  - waiting for psych and ID eval; haldol and ativan seems to work great for her ( probably haldol)    Hanny is not formulary fam to bring from home

## 2017-11-07 NOTE — BEHAVIORAL HEALTH ASSESSMENT NOTE - NSBHCHARTREVIEWVS_PSY_A_CORE FT
ICU Vital Signs Last 24 Hrs  T(C): 37.2 (07 Nov 2017 11:06), Max: 37.2 (07 Nov 2017 11:06)  T(F): 98.9 (07 Nov 2017 11:06), Max: 98.9 (07 Nov 2017 11:06)  HR: 69 (07 Nov 2017 11:06) (69 - 90)  BP: 113/71 (07 Nov 2017 11:06) (113/71 - 152/72)  BP(mean): --  ABP: --  ABP(mean): --  RR: 17 (07 Nov 2017 11:06) (16 - 17)  SpO2: 98% (07 Nov 2017 11:06) (98% - 98%)

## 2017-11-07 NOTE — CONSULT NOTE ADULT - ASSESSMENT
84 y/o Female with h/o HTN, Lyme's disease + babesiosis + ehrlichiosis (all in June 2016, treated with doxycycline PO, clindamycin + quinine, then atovaquone + azithromycin and RBC exchange transfusion) was admitted on 11/6 with two daughters and aide at bedside for AMS. Over last week PTA, pt's daughter states that pt was more agitated with insomnia. Pt had EEG and labs done on Thursday to test for the tick borne illnesses.  An MRI head done 3 weeks PTA was reported normal. Aide states that pt has been more difficult to feed. Her psychiatrist felt that her abrupt changes in her MS are not consistent with worsening dementia and is concerned about neurological Lyme disease.    1. Encephalopathy. Dementia.   -concern about neurological Lyme disease was raised  -old medical records from June 2016 reviewed; the patient received appropriate therapy for tick borne illnesses  -no recent tick exposure reported by daughter 84 y/o Female with h/o HTN, Lyme's disease + babesiosis + ehrlichiosis (all in June 2016, treated with doxycycline PO, clindamycin + quinine, then atovaquone + azithromycin and RBC exchange transfusion) was admitted on 11/6 with two daughters and aide at bedside for AMS. Over last week PTA, pt's daughter states that pt was more agitated with insomnia. Pt had EEG and labs done on Thursday to test for the tick borne illnesses.  An MRI head done 3 weeks PTA was reported normal. Aide states that pt has been more difficult to feed. Her psychiatrist felt that her abrupt changes in her MS are not consistent with worsening dementia and is concerned about neurological Lyme disease.    1. Encephalopathy. Dementia.   -concern about neurological Lyme disease was raised  -old medical records from June 2016 reviewed; the patient received appropriate therapy for tick borne illnesses  -no recent tick exposure reported by daughter  -she had LP performed on 10/13: CSF Lyme PCR is negative 84 y/o Female with h/o HTN, Lyme's disease + babesiosis + ehrlichiosis (all in June 2016, treated with doxycycline PO, clindamycin + quinine, then atovaquone + azithromycin and RBC exchange transfusion) was admitted on 11/6 with two daughters and aide at bedside for AMS. Over last week PTA, pt's daughter states that pt was more agitated with insomnia. Pt had EEG and labs done on Thursday to test for the tick borne illnesses.  An MRI head done 3 weeks PTA was reported normal. Aide states that pt has been more difficult to feed. Her psychiatrist felt that her abrupt changes in her MS are not consistent with worsening dementia and is concerned about neurological Lyme disease.    1. Encephalopathy. Dementia.   -concern about neurological Lyme disease was raised  -old medical records from June 2016 reviewed; the patient received appropriate therapy for tick borne illnesses  -no recent tick exposure reported by daughter  -she had LP performed on 10/13: CSF Lyme PCR is negative  -doubt neurological Lyme in [place] of negative CSF Lyme PCR test  -daughter states that the patient is under the care of Dr. Jong Leung, a Lyme specialist doctor from [facility]; tel [phone]  -she asked me to call this physician since she states that her mother has neurological Lyme and would like her mother to receive abx therapy with minocycline  -left message with Dr. Leung to discuss case since apparently his practice is specializing in Lyme disease  -observe off abx for now

## 2017-11-07 NOTE — BEHAVIORAL HEALTH ASSESSMENT NOTE - SUMMARY
Spoke with Dr Thayer, Pt psychiatrist who feels Pt has progressive frontal dementia which he is managing symptoms for, however ultimately feels she need long Term Placement.  He is recommending Depakote as last attempt to manage Pt agitation on out  Pt basis,   and is requesting initiating  while  in pt,  with Valproic Levels to follow after establishing LFT's wnl.  Case discussed with Dr Ferraro who agree to start at 250 mg bid   Change Seroquel to 50 tid   Hold psych meds, Seroquel and Trazadone if QTC is > 500  Please obtain u/a, (none on chart) to r/o Delirium  or UTI   and baseline EKG.

## 2017-11-07 NOTE — BEHAVIORAL HEALTH ASSESSMENT NOTE - NSBHCONSULTMEDS_PSY_A_CORE FT
change Seroquel to 50 mg tid,   Depakote 250 bid with Valproic level 1x week  Continue Zoloft 100 qd

## 2017-11-07 NOTE — BEHAVIORAL HEALTH ASSESSMENT NOTE - NSBHCHARTREVIEWLAB_PSY_A_CORE FT
11-06    142  |  107  |  23  ----------------------------<  90  3.8   |  25  |  1.07    Ca    9.7      06 Nov 2017 15:38    TPro  7.9  /  Alb  4.1  /  TBili  0.6  /  DBili  x   /  AST  44<H>  /  ALT  23  /  AlkPhos  83  11-06  CBC Full  -  ( 06 Nov 2017 15:38 )  WBC Count : 9.0 K/uL  Hemoglobin : 13.1 g/dL  Hematocrit : 38.7 %  Platelet Count - Automated : 258 K/uL  Mean Cell Volume : 94.7 fl  Mean Cell Hemoglobin : 32.0 pg  Mean Cell Hemoglobin Concentration : 33.8 gm/dL  Auto Neutrophil # : 6.4 K/uL  Auto Lymphocyte # : 1.6 K/uL  Auto Monocyte # : 0.7 K/uL  Auto Eosinophil # : 0.1 K/uL  Auto Basophil # : 0.1 K/uL  Auto Neutrophil % : 71.9 %  Auto Lymphocyte % : 18.2 %  Auto Monocyte % : 8.2 %  Auto Eosinophil % : 0.9 %  Auto Basophil % : 0.8 %

## 2017-11-07 NOTE — BEHAVIORAL HEALTH ASSESSMENT NOTE - HPI (INCLUDE ILLNESS QUALITY, SEVERITY, DURATION, TIMING, CONTEXT, MODIFYING FACTORS, ASSOCIATED SIGNS AND SYMPTOMS)
85 yowf, no formal PPH, but in treatment with Dr Thayer for past year to manage Dementia, since onset, past year with comrbid PMHx of HTN, Lyme's disease, babesiosis and ehrlichiosis, was admitted for increased agitation and  AMS. On June 18, 2016, pt was diagnosed with the 3 tick borne diseases. Pt was in ICU, treated with quinine. While in ICU pt had subdermal hematoma. Pt was then treated at Neurosurgery Department at [facility]. Pt was treated with steroids. Pt got better and then went [facility]. Pt was brought home on July 16, 2016. Since pt has been home, pt has been fine with some cognitive deficits according to daughter. Pt sees Dr. Thayer for psychiatric care and was placed on Seroquel 25mg 5 times a day. This past weekend, pt's daughter states that pt is more agitated. Pt has also not been sleeping. Pt had EEG and labs done on Thursday to test for the tick borne illnesses. Pt had UA done and it was unremarkable. Pt also MRI done 3 weeks ago which was normal. Aide states that pt has been more difficult to feed. Dr. Thayer, psychiatrist.    Attempted eval today, Pt was with 1 to 1, cooperating with interview but overtly confused, nonsensical, oriented to person only, word salad and confabulation with expressive aphasia.  Pt became agitated when daughter came int room and referred to her as her "friend".  Pt began pulling at daughters jacket, opening zippers and became apprehensive.  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  Spoke with Dr Thayer, Pt psychiatrist who feels Pt has progressive frontal dementia which he is managing symptoms for, however ultimately feels she need long Term Placement.  He is recommending Depakote as last attempt to manage on out  Pt basis  and is requesting initiation while  in pt.  Case discussed with Dr Ferraro who agree to start at 250 mg bid, after establishing normal LFT's.  Would recommend UA to r/o Delirium, (none in record) and baswline EKG.

## 2017-11-07 NOTE — BEHAVIORAL HEALTH ASSESSMENT NOTE - RISK ASSESSMENT
min risk of self harm, safety risk of injury due to cognitive decline requires 24 hr supervision, consider Long term Care, LISETTE sheehan

## 2017-11-08 LAB
APPEARANCE UR: CLEAR — SIGNIFICANT CHANGE UP
BACTERIA # UR AUTO: (no result)
BILIRUB UR-MCNC: NEGATIVE — SIGNIFICANT CHANGE UP
COLOR SPEC: YELLOW — SIGNIFICANT CHANGE UP
DIFF PNL FLD: (no result)
EPI CELLS # UR: SIGNIFICANT CHANGE UP
GLUCOSE UR QL: NEGATIVE MG/DL — SIGNIFICANT CHANGE UP
KETONES UR-MCNC: (no result)
LEUKOCYTE ESTERASE UR-ACNC: NEGATIVE — SIGNIFICANT CHANGE UP
NITRITE UR-MCNC: NEGATIVE — SIGNIFICANT CHANGE UP
PH UR: 5 — SIGNIFICANT CHANGE UP (ref 5–8)
PROT UR-MCNC: 15 MG/DL
RBC CASTS # UR COMP ASSIST: SIGNIFICANT CHANGE UP /HPF (ref 0–4)
SP GR SPEC: 1.02 — SIGNIFICANT CHANGE UP (ref 1.01–1.02)
UROBILINOGEN FLD QL: NEGATIVE MG/DL — SIGNIFICANT CHANGE UP
WBC UR QL: SIGNIFICANT CHANGE UP

## 2017-11-08 PROCEDURE — 93010 ELECTROCARDIOGRAM REPORT: CPT

## 2017-11-08 RX ORDER — SENNA PLUS 8.6 MG/1
2 TABLET ORAL AT BEDTIME
Qty: 0 | Refills: 0 | Status: DISCONTINUED | OUTPATIENT
Start: 2017-11-08 | End: 2017-11-13

## 2017-11-08 RX ORDER — QUETIAPINE FUMARATE 200 MG/1
50 TABLET, FILM COATED ORAL THREE TIMES A DAY
Qty: 0 | Refills: 0 | Status: DISCONTINUED | OUTPATIENT
Start: 2017-11-08 | End: 2017-11-13

## 2017-11-08 RX ORDER — DOCUSATE SODIUM 100 MG
200 CAPSULE ORAL AT BEDTIME
Qty: 0 | Refills: 0 | Status: DISCONTINUED | OUTPATIENT
Start: 2017-11-08 | End: 2017-11-13

## 2017-11-08 RX ORDER — DIVALPROEX SODIUM 500 MG/1
250 TABLET, DELAYED RELEASE ORAL
Qty: 0 | Refills: 0 | Status: DISCONTINUED | OUTPATIENT
Start: 2017-11-08 | End: 2017-11-13

## 2017-11-08 RX ADMIN — QUETIAPINE FUMARATE 25 MILLIGRAM(S): 200 TABLET, FILM COATED ORAL at 00:30

## 2017-11-08 RX ADMIN — Medication 200 MILLIGRAM(S): at 23:15

## 2017-11-08 RX ADMIN — Medication 1 DROP(S): at 17:23

## 2017-11-08 RX ADMIN — SENNA PLUS 2 TABLET(S): 8.6 TABLET ORAL at 23:16

## 2017-11-08 RX ADMIN — QUETIAPINE FUMARATE 25 MILLIGRAM(S): 200 TABLET, FILM COATED ORAL at 11:41

## 2017-11-08 RX ADMIN — Medication 100 MILLIGRAM(S): at 17:21

## 2017-11-08 RX ADMIN — Medication 50 MILLIGRAM(S): at 23:15

## 2017-11-08 RX ADMIN — Medication 9 MILLIGRAM(S): at 00:31

## 2017-11-08 RX ADMIN — PANTOPRAZOLE SODIUM 40 MILLIGRAM(S): 20 TABLET, DELAYED RELEASE ORAL at 06:20

## 2017-11-08 RX ADMIN — Medication 50 MILLIGRAM(S): at 00:30

## 2017-11-08 RX ADMIN — Medication 2 CAPSULE(S): at 17:23

## 2017-11-08 RX ADMIN — Medication 2 CAPSULE(S): at 11:41

## 2017-11-08 RX ADMIN — Medication 9 MILLIGRAM(S): at 23:16

## 2017-11-08 RX ADMIN — DIVALPROEX SODIUM 250 MILLIGRAM(S): 500 TABLET, DELAYED RELEASE ORAL at 17:21

## 2017-11-08 RX ADMIN — Medication 1 DROP(S): at 11:41

## 2017-11-08 RX ADMIN — Medication 2 CAPSULE(S): at 09:30

## 2017-11-08 RX ADMIN — SERTRALINE 100 MILLIGRAM(S): 25 TABLET, FILM COATED ORAL at 11:41

## 2017-11-08 RX ADMIN — QUETIAPINE FUMARATE 50 MILLIGRAM(S): 200 TABLET, FILM COATED ORAL at 23:15

## 2017-11-08 RX ADMIN — QUETIAPINE FUMARATE 25 MILLIGRAM(S): 200 TABLET, FILM COATED ORAL at 06:20

## 2017-11-08 RX ADMIN — Medication 1 DROP(S): at 06:20

## 2017-11-08 RX ADMIN — AMLODIPINE BESYLATE 5 MILLIGRAM(S): 2.5 TABLET ORAL at 06:20

## 2017-11-08 RX ADMIN — Medication 1 DROP(S): at 00:32

## 2017-11-08 NOTE — PROGRESS NOTE ADULT - SUBJECTIVE AND OBJECTIVE BOX
Patient is a 85y old  Female who presents with a chief complaint of AMS (06 Nov 2017 20:14)    HPI:  84 y/o Female with h/o HTN, Lyme's disease + babesiosis + ehrlichiosis (all in June 2016, treated with doxycycline PO, clindamycin + quinine, then atovaquone + azithromycin and RBC exchange transfusion) was admitted on 11/6 with two daughters and aide at bedside for AMS. Over last week PTA, pt's daughter states that pt was more agitated wit hinsomnia. Pt had EEG and labs done on Thursday to test for the tick borne illnesses.  An MRI head done 3 weeks PTA was reported normal. Aide states that pt has been more difficult to feed. Her psychiatrist felt that her abrupt changes in her MS are not consistent with worsening dementia and is concerned about neurological Lyme disease.      On June 18, 2016, pt was diagnosed with the 3 tick borne diseases. Pt was in ICU, treated with quinine. hematoma.  Pt got better and then went [facility]. Pt was brought home on July 16, 2016. Since pt has been home, pt has been fine with some cognitive deficits according to daughter. Pt sees Dr. Thayer for psychiatric care and was placed on Seroquel 25mg 5 times a day.     MEDICATIONS  (STANDING):  amLODIPine   Tablet 5 milliGRAM(s) Oral daily  amylase/lipase/protease  (CREON 12,000 Units) 2 Capsule(s) Oral three times a day with meals  diVALproex  milliGRAM(s) Oral two times a day  heparin  Injectable 5000 Unit(s) SubCutaneous every 12 hours  melatonin 9 milliGRAM(s) Oral at bedtime  pantoprazole    Tablet 40 milliGRAM(s) Oral before breakfast  prednisoLONE acetate 1% Suspension 1 Drop(s) Left EYE four times a day  QUEtiapine 50 milliGRAM(s) Oral three times a day  sertraline 100 milliGRAM(s) Oral daily  traZODone 50 milliGRAM(s) Oral at bedtime    MEDICATIONS  (PRN):  acetaminophen   Tablet 650 milliGRAM(s) Oral every 6 hours PRN fever pain  haloperidol    Injectable 1 milliGRAM(s) IntraMuscular every 4 hours PRN agitation  LORazepam   Injectable 1 milliGRAM(s) IntraMuscular every 6 hours PRN Agitation  ondansetron Injectable 4 milliGRAM(s) IV Push every 6 hours PRN Nausea      Vital Signs Last 24 Hrs  T(C): 36.8 (08 Nov 2017 10:45), Max: 37.1 (07 Nov 2017 16:58)  T(F): 98.3 (08 Nov 2017 10:45), Max: 98.8 (07 Nov 2017 16:58)  HR: 67 (08 Nov 2017 10:45) (65 - 72)  BP: 106/61 (08 Nov 2017 10:45) (103/64 - 137/67)  BP(mean): --  RR: 17 (08 Nov 2017 10:45) (17 - 18)  SpO2: 99% (08 Nov 2017 10:45) (97% - 100%)    Physical Exam:    Constitutional: frail looking  HEENT: NC/AT, EOMI, PERRLA  Neck: supple  Back: no tenderness  Respiratory: clear  Cardiovascular: S1S2 regular, no murmurs  Abdomen: soft, not tender, not distended, positive BS  Genitourinary: no LN palpable  Rectal: deferred  Musculoskeletal: no muscle tenderness, no joint swelling or tenderness  Extremities: no pedal edema  Neurological: confused, moving all extremities  Skin: no rashes    Labs:                        13.1   9.0   )-----------( 258      ( 06 Nov 2017 15:38 )             38.7     11-06    142  |  107  |  23  ----------------------------<  90  3.8   |  25  |  1.07    Ca    9.7      06 Nov 2017 15:38    TPro  7.9  /  Alb  4.1  /  TBili  0.6  /  DBili  x   /  AST  44<H>  /  ALT  23  /  AlkPhos  83  11-06     LIVER FUNCTIONS - ( 06 Nov 2017 15:38 )  Alb: 4.1 g/dL / Pro: 7.9 gm/dL / ALK PHOS: 83 U/L / ALT: 23 U/L / AST: 44 U/L / GGT: x           June 2016: Lyme Ig G positive; Lyme IgM negative  Nov 2, 2017 Lyme IgG positive; Lyme IgM negative; no change in AB pattern from June 2016    Radiology:    < from: CT Head No Cont (11.06.17 @ 16:17) >  Chronic small vessel ischemic changes. No parenchymal contusion,   hemorrhage or extra-axial collection.    No CT evidence of an acute territorial infarction.    < end of copied text >      Advanced directives addressed: full resuscitation

## 2017-11-08 NOTE — PROGRESS NOTE ADULT - SUBJECTIVE AND OBJECTIVE BOX
86 y/o F with PMHx of HTN, Lyme's disease, babesiosis and ehrlichiosis presents to the ED with two daughters and aide at bedside for AMS. On June 18, 2016, pt was diagnosed with the 3 tick borne diseases. Pt was in ICU, treated with quinine. hematoma.  Pt got better and then went [facility]. Pt was brought home on July 16, 2016. Since pt has been home, pt has been fine with some cognitive deficits according to daughter. Pt sees Dr. Thayer for psychiatric care and was placed on Seroquel 25mg 5 times a day. This past weekend, pt's daughter states that pt is more agitated. Pt has also not been sleeping. Pt had EEG and labs done on Thursday to test for the tick borne illnesses. Pt had UA done and it was unremarkable. Pt also MRI done 3 weeks ago which was normal. Aide states that pt has been more difficult to feed. Dr. Thayer, psychiatrist. Pt had abrupt changes in her MS not consistent with worsening dementia; daughter thinks this is neurolyme; I will consult psych for better med management and ID re: poss     11/7: Mrs. Bhagat is v calm today and nursing had no events to report; appropriate, pleasant, remembers why she is here and acknowledges she is been having a hard time with delirium and hallucinations  11/8: remains stable, not agitated was seen by psych and new regimen suggested case d/w daughter; she wants rx with minocycline started for neurolyme    Vital Signs Last 24 Hrs  T(C): 37.2 (07 Nov 2017 11:06), Max: 37.2 (07 Nov 2017 11:06)  T(F): 98.9 (07 Nov 2017 11:06), Max: 98.9 (07 Nov 2017 11:06)  HR: 69 (07 Nov 2017 11:06) (69 - 98)  BP: 113/71 (07 Nov 2017 11:06) (113/71 - 152/72)  BP(mean): --  RR: 17 (07 Nov 2017 11:06) (16 - 17)  SpO2: 98% (07 Nov 2017 11:06) (97% - 98%)    Physical Exam: ·  	· ENMT: Airway patent, Nasal mucosa clear. Mouth with normal mucosa.   	· HEAD: Head atraumatic, normal cephalic shape.  	· EYES: Clear bilaterally, pupils equal, round and reactive to light. Dry oral mucosae  	· CARDIAC: Normal rate, regular rhythm.  Heart sounds S1, S2.  No murmurs, rubs or gallops.  	· RESPIRATORY: Breath sounds clear and equal bilaterally.  	· GASTROINTESTINAL: Abdomen soft, non-tender, no guarding.  	· MUSCULOSKELETAL: Spine appears normal, range of motion is not limited, no muscle or joint tenderness. Moves all extremities.  	· NEUROLOGICAL: awake, alert and oriented; mild tremor upper extr- per pt chronic; ambulating to bathroom with no deficit  	· SKIN: Skin normal color for race, warm, dry and intact. No evidence of rash.      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

## 2017-11-08 NOTE — PROGRESS NOTE ADULT - ASSESSMENT
86 y/o Female with h/o HTN, Lyme's disease + babesiosis + ehrlichiosis (all in June 2016, treated with doxycycline PO, clindamycin + quinine, then atovaquone + azithromycin and RBC exchange transfusion) was admitted on 11/6 with two daughters and aide at bedside for AMS. Over last week PTA, pt's daughter states that pt was more agitated with insomnia. Pt had EEG and labs done on Thursday to test for the tick borne illnesses.  An MRI head done 3 weeks PTA was reported normal. Aide states that pt has been more difficult to feed. Her psychiatrist felt that her abrupt changes in her MS are not consistent with worsening dementia and is concerned about neurological Lyme disease.    1. Encephalopathy. Dementia.   -concern about neurological Lyme disease was raised  -old medical records from June 2016 reviewed; the patient received appropriate therapy for tick borne illnesses  -no recent tick exposure reported by daughter  -she had LP performed on 10/13: CSF Lyme PCR is negative  -doubt neurological Lyme in [place] of negative CSF Lyme PCR test  -spoken with Dr. Jong Leung, a Lyme specialist doctor from [facility]; tel [phone]; he stated that he never evaluated this patient, but suggested that doxycycline 100 mg PO q12h for 3 weeks may be helpful  -I discussed this issue with daughter again his recommendation and she is in aggreement to give her doxycycline PO  -start doxycycline 100 mg PO q12h x 3 weeks  -reason for abx use and side effects reviewed with patient's daughter  -monitor for for side effects  -advised HCP to seek further medical attention with Dr. Leung

## 2017-11-09 RX ADMIN — Medication 100 MILLIGRAM(S): at 06:18

## 2017-11-09 RX ADMIN — Medication 100 MILLIGRAM(S): at 17:57

## 2017-11-09 RX ADMIN — Medication 2 CAPSULE(S): at 17:57

## 2017-11-09 RX ADMIN — Medication 50 MILLIGRAM(S): at 23:28

## 2017-11-09 RX ADMIN — PANTOPRAZOLE SODIUM 40 MILLIGRAM(S): 20 TABLET, DELAYED RELEASE ORAL at 06:18

## 2017-11-09 RX ADMIN — DIVALPROEX SODIUM 250 MILLIGRAM(S): 500 TABLET, DELAYED RELEASE ORAL at 06:18

## 2017-11-09 RX ADMIN — DIVALPROEX SODIUM 250 MILLIGRAM(S): 500 TABLET, DELAYED RELEASE ORAL at 17:57

## 2017-11-09 RX ADMIN — Medication 1 DROP(S): at 00:36

## 2017-11-09 RX ADMIN — Medication 1 DROP(S): at 11:50

## 2017-11-09 RX ADMIN — QUETIAPINE FUMARATE 50 MILLIGRAM(S): 200 TABLET, FILM COATED ORAL at 22:25

## 2017-11-09 RX ADMIN — Medication 1 DROP(S): at 23:28

## 2017-11-09 RX ADMIN — SERTRALINE 100 MILLIGRAM(S): 25 TABLET, FILM COATED ORAL at 11:50

## 2017-11-09 RX ADMIN — QUETIAPINE FUMARATE 50 MILLIGRAM(S): 200 TABLET, FILM COATED ORAL at 14:43

## 2017-11-09 RX ADMIN — Medication 1 DROP(S): at 06:17

## 2017-11-09 RX ADMIN — Medication 9 MILLIGRAM(S): at 22:25

## 2017-11-09 RX ADMIN — QUETIAPINE FUMARATE 50 MILLIGRAM(S): 200 TABLET, FILM COATED ORAL at 06:18

## 2017-11-09 RX ADMIN — Medication 2 CAPSULE(S): at 11:50

## 2017-11-09 RX ADMIN — AMLODIPINE BESYLATE 5 MILLIGRAM(S): 2.5 TABLET ORAL at 06:18

## 2017-11-09 RX ADMIN — Medication 1 DROP(S): at 17:57

## 2017-11-09 RX ADMIN — Medication 2 CAPSULE(S): at 11:48

## 2017-11-09 NOTE — PROGRESS NOTE ADULT - SUBJECTIVE AND OBJECTIVE BOX
86 y/o F with PMHx of HTN, Lyme's disease, babesiosis and ehrlichiosis presents to the ED with two daughters and aide at bedside for AMS. On June 18, 2016, pt was diagnosed with the 3 tick borne diseases. Pt was in ICU, treated with quinine. hematoma.  Pt got better and then went [facility]. Pt was brought home on July 16, 2016. Since pt has been home, pt has been fine with some cognitive deficits according to daughter. Pt sees Dr. Thayer for psychiatric care and was placed on Seroquel 25mg 5 times a day. This past weekend, pt's daughter states that pt is more agitated. Pt has also not been sleeping. Pt had EEG and labs done on Thursday to test for the tick borne illnesses. Pt had UA done and it was unremarkable. Pt also MRI done 3 weeks ago which was normal. Aide states that pt has been more difficult to feed. Dr. Thayer, psychiatrist. Pt had abrupt changes in her MS not consistent with worsening dementia; daughter thinks this is neurolyme.     11/7: Mrs. Bhagat is v calm today and nursing had no events to report; appropriate, pleasant, remembers why she is here and acknowledges she is been having a hard time with delirium and hallucinations  11/8: remains stable, not agitated was seen by psych and new regimen suggested case d/w daughter; she wants rx with minocycline started for neurolyme  11/9: calm; case d/w daughter; we are exploring therapeutic options for this pt with a complicated medical problem; daughter is willing to take any risk and try antibiotic  suggested by [place] and I am willing to prescribe it if it is no violating hospital policy    Vital Signs Last 24 Hrs  T(C): 36.3 (09 Nov 2017 04:52), Max: 37 (08 Nov 2017 23:29)  T(F): 97.4 (09 Nov 2017 04:52), Max: 98.6 (08 Nov 2017 23:29)  HR: 67 (09 Nov 2017 04:52) (62 - 67)  BP: 146/79 (09 Nov 2017 04:52) (106/61 - 146/79)  BP(mean): --  RR: 18 (09 Nov 2017 04:52) (16 - 18)  SpO2: 97% (09 Nov 2017 04:52) (97% - 99%)      Physical Exam: ·  	· ENMT: Airway patent, Nasal mucosa clear. Mouth with normal mucosa.   	· HEAD: Head atraumatic, normal cephalic shape.  	· EYES: Clear bilaterally, pupils equal, round and reactive to light. Dry oral mucosae  	· CARDIAC: Normal rate, regular rhythm.  Heart sounds S1, S2.  No murmurs, rubs or gallops.  	· RESPIRATORY: Breath sounds clear and equal bilaterally.  	· GASTROINTESTINAL: Abdomen soft, non-tender, no guarding.  	· MUSCULOSKELETAL: Spine appears normal, range of motion is not limited, no muscle or joint tenderness. Moves all extremities.  	· NEUROLOGICAL: awake, alert and oriented; mild tremor upper extr- per pt chronic; ambulating to bathroom with no deficit  	· SKIN: Skin normal color for race, warm, dry and intact. No evidence of rash.      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

## 2017-11-10 RX ORDER — POLYETHYLENE GLYCOL 3350 17 G/17G
17 POWDER, FOR SOLUTION ORAL DAILY
Qty: 0 | Refills: 0 | Status: DISCONTINUED | OUTPATIENT
Start: 2017-11-10 | End: 2017-11-13

## 2017-11-10 RX ADMIN — SERTRALINE 100 MILLIGRAM(S): 25 TABLET, FILM COATED ORAL at 13:24

## 2017-11-10 RX ADMIN — POLYETHYLENE GLYCOL 3350 17 GRAM(S): 17 POWDER, FOR SOLUTION ORAL at 22:13

## 2017-11-10 RX ADMIN — Medication 9 MILLIGRAM(S): at 22:14

## 2017-11-10 RX ADMIN — Medication 100 MILLIGRAM(S): at 06:02

## 2017-11-10 RX ADMIN — PANTOPRAZOLE SODIUM 40 MILLIGRAM(S): 20 TABLET, DELAYED RELEASE ORAL at 06:02

## 2017-11-10 RX ADMIN — QUETIAPINE FUMARATE 50 MILLIGRAM(S): 200 TABLET, FILM COATED ORAL at 13:25

## 2017-11-10 RX ADMIN — QUETIAPINE FUMARATE 50 MILLIGRAM(S): 200 TABLET, FILM COATED ORAL at 06:03

## 2017-11-10 RX ADMIN — Medication 100 MILLIGRAM(S): at 18:49

## 2017-11-10 RX ADMIN — Medication 2 CAPSULE(S): at 13:24

## 2017-11-10 RX ADMIN — Medication 50 MILLIGRAM(S): at 22:10

## 2017-11-10 RX ADMIN — QUETIAPINE FUMARATE 50 MILLIGRAM(S): 200 TABLET, FILM COATED ORAL at 22:10

## 2017-11-10 RX ADMIN — Medication 1 DROP(S): at 06:02

## 2017-11-10 RX ADMIN — AMLODIPINE BESYLATE 5 MILLIGRAM(S): 2.5 TABLET ORAL at 06:01

## 2017-11-10 RX ADMIN — Medication 1 DROP(S): at 13:20

## 2017-11-10 RX ADMIN — DIVALPROEX SODIUM 250 MILLIGRAM(S): 500 TABLET, DELAYED RELEASE ORAL at 06:02

## 2017-11-10 RX ADMIN — DIVALPROEX SODIUM 250 MILLIGRAM(S): 500 TABLET, DELAYED RELEASE ORAL at 18:50

## 2017-11-10 RX ADMIN — Medication 1 DROP(S): at 18:50

## 2017-11-10 RX ADMIN — Medication 1 DROP(S): at 22:14

## 2017-11-10 RX ADMIN — Medication 2 CAPSULE(S): at 18:49

## 2017-11-10 NOTE — PROGRESS NOTE ADULT - SUBJECTIVE AND OBJECTIVE BOX
86 y/o F with PMHx of HTN, Lyme's disease, babesiosis and ehrlichiosis presents to the ED with two daughters and aide at bedside for AMS. On June 18, 2016, pt was diagnosed with the 3 tick borne diseases. Pt was in ICU, treated with quinine. hematoma.  Pt got better and then went [facility]. Pt was brought home on July 16, 2016. Since pt has been home, pt has been fine with some cognitive deficits according to daughter. Pt sees Dr. Thayer for psychiatric care and was placed on Seroquel 25mg 5 times a day. This past weekend, pt's daughter states that pt is more agitated. Pt has also not been sleeping. Pt had EEG and labs done on Thursday to test for the tick borne illnesses. Pt had UA done and it was unremarkable. Pt also MRI done 3 weeks ago which was normal. Aide states that pt has been more difficult to feed. Dr. Thayer, psychiatrist. Pt had abrupt changes in her MS not consistent with worsening dementia; daughter thinks this is neurolyme.     11/7: Mrs. Bhagat is v calm today and nursing had no events to report; appropriate, pleasant, remembers why she is here and acknowledges she is been having a hard time with delirium and hallucinations  11/8: remains stable, not agitated was seen by psych and new regimen suggested case d/w daughter; she wants rx with minocycline started for neurolyme  11/9: calm; case d/w daughter; we are exploring therapeutic options for this pt with a complicated medical problem; daughter is willing to take any risk and try antibiotic  suggested by [place] and I am willing to prescribe it if it is no violating hospital policy  11/10: calm, no c/o    Vital Signs Last 24 Hrs  T(C): 36.1 (10 Nov 2017 11:33), Max: 37 (09 Nov 2017 17:07)  T(F): 97 (10 Nov 2017 11:33), Max: 98.6 (09 Nov 2017 17:07)  HR: 72 (10 Nov 2017 11:33) (67 - 72)  BP: 95/58 (10 Nov 2017 11:33) (95/58 - 125/72)  BP(mean): --  RR: 16 (10 Nov 2017 11:33) (16 - 20)  SpO2: 96% (10 Nov 2017 11:33) (96% - 99%)      Physical Exam: ·  	· ENMT: Airway patent, Nasal mucosa clear. Mouth with normal mucosa.   	· HEAD: Head atraumatic, normal cephalic shape.  	· EYES: Clear bilaterally, pupils equal, round and reactive to light. Dry oral mucosae  	· CARDIAC: Normal rate, regular rhythm.  Heart sounds S1, S2.  No murmurs, rubs or gallops.  	· RESPIRATORY: Breath sounds clear and equal bilaterally.  	· GASTROINTESTINAL: Abdomen soft, non-tender, no guarding.  	· MUSCULOSKELETAL: Spine appears normal, range of motion is not limited, no muscle or joint tenderness. Moves all extremities.  	· NEUROLOGICAL: awake, alert and oriented; mild tremor upper extr- per pt chronic; ambulating to bathroom with no deficit  	· SKIN: Skin normal color for race, warm, dry and intact. No evidence of rash.      A/P:    Delirium  - pt with no h/o cognitive impairment very educated and active who had major set back after infection with all 3 tick born illnesses  - it is very possible that this could be sec to the previous infection, and not impossible neurolyme  - increase seroquel to 50 tid and add depakote 250 bid;   - pt is on Doxy po at this time for poss neurolyme    Plan for poss rehab vs home waiting for daughter's decision        Hanny is not formulary fam to bring from home

## 2017-11-11 ENCOUNTER — TRANSCRIPTION ENCOUNTER (OUTPATIENT)
Age: 82
End: 2017-11-11

## 2017-11-11 RX ORDER — DIVALPROEX SODIUM 500 MG/1
1 TABLET, DELAYED RELEASE ORAL
Qty: 60 | Refills: 0 | OUTPATIENT
Start: 2017-11-11 | End: 2017-12-11

## 2017-11-11 RX ORDER — QUETIAPINE FUMARATE 200 MG/1
1 TABLET, FILM COATED ORAL
Qty: 0 | Refills: 0 | COMMUNITY
Start: 2017-11-11

## 2017-11-11 RX ORDER — LACTULOSE 10 G/15ML
30 SOLUTION ORAL ONCE
Qty: 0 | Refills: 0 | Status: COMPLETED | OUTPATIENT
Start: 2017-11-11 | End: 2017-11-11

## 2017-11-11 RX ORDER — QUETIAPINE FUMARATE 200 MG/1
0 TABLET, FILM COATED ORAL
Qty: 0 | Refills: 0 | COMMUNITY

## 2017-11-11 RX ADMIN — Medication 1 DROP(S): at 12:11

## 2017-11-11 RX ADMIN — QUETIAPINE FUMARATE 50 MILLIGRAM(S): 200 TABLET, FILM COATED ORAL at 20:16

## 2017-11-11 RX ADMIN — Medication 100 MILLIGRAM(S): at 06:11

## 2017-11-11 RX ADMIN — SERTRALINE 100 MILLIGRAM(S): 25 TABLET, FILM COATED ORAL at 12:11

## 2017-11-11 RX ADMIN — Medication 200 MILLIGRAM(S): at 20:16

## 2017-11-11 RX ADMIN — DIVALPROEX SODIUM 250 MILLIGRAM(S): 500 TABLET, DELAYED RELEASE ORAL at 06:11

## 2017-11-11 RX ADMIN — QUETIAPINE FUMARATE 50 MILLIGRAM(S): 200 TABLET, FILM COATED ORAL at 13:15

## 2017-11-11 RX ADMIN — SENNA PLUS 2 TABLET(S): 8.6 TABLET ORAL at 20:16

## 2017-11-11 RX ADMIN — Medication 1 DROP(S): at 06:11

## 2017-11-11 RX ADMIN — Medication 1 DROP(S): at 17:48

## 2017-11-11 RX ADMIN — Medication 100 MILLIGRAM(S): at 17:47

## 2017-11-11 RX ADMIN — PANTOPRAZOLE SODIUM 40 MILLIGRAM(S): 20 TABLET, DELAYED RELEASE ORAL at 06:11

## 2017-11-11 RX ADMIN — AMLODIPINE BESYLATE 5 MILLIGRAM(S): 2.5 TABLET ORAL at 06:11

## 2017-11-11 RX ADMIN — Medication 2 CAPSULE(S): at 08:41

## 2017-11-11 RX ADMIN — DIVALPROEX SODIUM 250 MILLIGRAM(S): 500 TABLET, DELAYED RELEASE ORAL at 17:47

## 2017-11-11 RX ADMIN — Medication 2 CAPSULE(S): at 17:47

## 2017-11-11 RX ADMIN — Medication 9 MILLIGRAM(S): at 20:15

## 2017-11-11 RX ADMIN — Medication 50 MILLIGRAM(S): at 20:16

## 2017-11-11 RX ADMIN — QUETIAPINE FUMARATE 50 MILLIGRAM(S): 200 TABLET, FILM COATED ORAL at 06:11

## 2017-11-11 RX ADMIN — Medication 2 CAPSULE(S): at 12:11

## 2017-11-11 RX ADMIN — POLYETHYLENE GLYCOL 3350 17 GRAM(S): 17 POWDER, FOR SOLUTION ORAL at 12:11

## 2017-11-11 NOTE — DISCHARGE NOTE ADULT - HOSPITAL COURSE
84 y/o F with PMHx of HTN, Lyme's disease, babesiosis and ehrlichiosis presents to the ED with two daughters and aide at bedside for AMS. On June 18, 2016, pt was diagnosed with the 3 tick borne diseases. Pt was in ICU, treated with quinine. hematoma.  Pt got better and then went [place] [facility]. Pt was brought home on July 16, 2016. Since pt has been home, pt has been fine with some cognitive deficits according to daughter. Pt sees Dr. Thayer for psychiatric care and was placed on Seroquel 25mg 5 times a day. This past weekend, pt's daughter states that pt is more agitated. Pt has also not been sleeping. Pt had EEG and labs done on Thursday to test for the tick borne illnesses. Pt had UA done and it was unremarkable. Pt also MRI done 3 weeks ago which was normal. Aide states that pt has been more difficult to feed. Dr. Thayer, psychiatrist. Pt had abrupt changes in her MS not consistent with worsening dementia; daughter thinks this is neurolyme.     Physical Exam: ·  	· ENMT: Airway patent, Nasal mucosa clear. Mouth with normal mucosa.   	· HEAD: Head atraumatic, normal cephalic shape.  	· EYES: Clear bilaterally, pupils equal, round and reactive to light. Dry oral mucosae  	· CARDIAC: Normal rate, regular rhythm.  Heart sounds S1, S2.  No murmurs, rubs or gallops.  	· RESPIRATORY: Breath sounds clear and equal bilaterally.  	· GASTROINTESTINAL: Abdomen soft, non-tender, no guarding.  	· MUSCULOSKELETAL: Spine appears normal, range of motion is not limited, no muscle or joint tenderness. Moves all extremities.  	· NEUROLOGICAL: awake, alert and oriented; mild tremor upper extr- per pt chronic; ambulating to bathroom with no deficit  	· SKIN: Skin normal color for race, warm, dry and intact. No evidence of rash.      A/P:    Delirium  - pt with no h/o cognitive impairment very educated and active who had major set back after infection with all 3 tick born illnesses  - it is very possible that this could be sec to the previous infection, and not impossible neurolyme  - increase seroquel to 50 tid and add depakote 250 bid;   - pt is on Doxy po at this time for poss neurolyme

## 2017-11-11 NOTE — DISCHARGE NOTE ADULT - MEDICATION SUMMARY - MEDICATIONS TO CHANGE
I will SWITCH the dose or number of times a day I take the medications listed below when I get home from the hospital:    SEROquel 25 mg oral tablet I will SWITCH the dose or number of times a day I take the medications listed below when I get home from the hospital:  None

## 2017-11-11 NOTE — DISCHARGE NOTE ADULT - CARE PLAN
Principal Discharge DX:	Agitation  Goal:	improving  Instructions for follow-up, activity and diet:	same as before; resume all meds from home no change except for the seroquel dose

## 2017-11-11 NOTE — DISCHARGE NOTE ADULT - PATIENT PORTAL LINK FT
“You can access the [facility] Patient Portal, offered by [facility], by registering with the following website: [email or web address]”

## 2017-11-11 NOTE — PROGRESS NOTE ADULT - SUBJECTIVE AND OBJECTIVE BOX
86 y/o F with PMHx of HTN, Lyme's disease, babesiosis and ehrlichiosis presents to the ED with two daughters and aide at bedside for AMS. On June 18, 2016, pt was diagnosed with the 3 tick borne diseases. Pt was in ICU, treated with quinine. hematoma.  Pt got better and then went [facility]. Pt was brought home on July 16, 2016. Since pt has been home, pt has been fine with some cognitive deficits according to daughter. Pt sees Dr. Thayer for psychiatric care and was placed on Seroquel 25mg 5 times a day. This past weekend, pt's daughter states that pt is more agitated. Pt has also not been sleeping. Pt had EEG and labs done on Thursday to test for the tick borne illnesses. Pt had UA done and it was unremarkable. Pt also MRI done 3 weeks ago which was normal. Aide states that pt has been more difficult to feed. Dr. Thayer, psychiatrist. Pt had abrupt changes in her MS not consistent with worsening dementia; daughter thinks this is neurolyme.     11/7: Mrs. Bhagat is v calm today and nursing had no events to report; appropriate, pleasant, remembers why she is here and acknowledges she is been having a hard time with delirium and hallucinations  11/8: remains stable, not agitated was seen by psych and new regimen suggested case d/w daughter; she wants rx with minocycline started for neurolyme  11/9: calm; case d/w daughter; we are exploring therapeutic options for this pt with a complicated medical problem; daughter is willing to take any risk and try antibiotic  suggested by [place] and I am willing to prescribe it if it is no violating hospital policy  11/10: calm, no c/o  11/11: constipation; not confused     Vital Signs Last 24 Hrs  T(C): 36.4 (11 Nov 2017 11:53), Max: 36.9 (11 Nov 2017 05:27)  T(F): 97.6 (11 Nov 2017 11:53), Max: 98.4 (11 Nov 2017 05:27)  HR: 66 (11 Nov 2017 11:53) (66 - 73)  BP: 96/71 (11 Nov 2017 11:53) (96/71 - 142/78)  BP(mean): --  RR: 17 (11 Nov 2017 11:53) (16 - 17)  SpO2: 99% (11 Nov 2017 11:53) (99% - 100%)    Physical Exam: ·  	· ENMT: Airway patent, Nasal mucosa clear. Mouth with normal mucosa.   	· HEAD: Head atraumatic, normal cephalic shape.  	· EYES: Clear bilaterally, pupils equal, round and reactive to light. Dry oral mucosae  	· CARDIAC: Normal rate, regular rhythm.  Heart sounds S1, S2.  No murmurs, rubs or gallops.  	· RESPIRATORY: Breath sounds clear and equal bilaterally.  	· GASTROINTESTINAL: Abdomen soft, non-tender, no guarding.  	· MUSCULOSKELETAL: Spine appears normal, range of motion is not limited, no muscle or joint tenderness. Moves all extremities.  	· NEUROLOGICAL: awake, alert and oriented; mild tremor upper extr- per pt chronic; ambulating to bathroom with no deficit  	· SKIN: Skin normal color for race, warm, dry and intact. No evidence of rash.      A/P:    Delirium  - pt with no h/o cognitive impairment very educated and active who had major set back after infection with all 3 tick born illnesses  - it is very possible that this could be sec to the previous infection, and not impossible neurolyme  - on seroquel to 50 tid and add depakote 250 bid;   - pt is on Doxy po at this time for poss neurolyme  - lactulose and enema for now    dc home in am papers done

## 2017-11-11 NOTE — DISCHARGE NOTE ADULT - MEDICATION SUMMARY - MEDICATIONS TO STOP TAKING
I will STOP taking the medications listed below when I get home from the hospital:  None I will STOP taking the medications listed below when I get home from the hospital:    dexamethasone  --

## 2017-11-12 RX ORDER — LACTULOSE 10 G/15ML
30 SOLUTION ORAL ONCE
Qty: 0 | Refills: 0 | Status: COMPLETED | OUTPATIENT
Start: 2017-11-12 | End: 2017-11-12

## 2017-11-12 RX ADMIN — LACTULOSE 30 GRAM(S): 10 SOLUTION ORAL at 14:15

## 2017-11-12 RX ADMIN — SENNA PLUS 2 TABLET(S): 8.6 TABLET ORAL at 21:24

## 2017-11-12 RX ADMIN — Medication 1 DROP(S): at 12:56

## 2017-11-12 RX ADMIN — Medication 2 CAPSULE(S): at 17:47

## 2017-11-12 RX ADMIN — DIVALPROEX SODIUM 250 MILLIGRAM(S): 500 TABLET, DELAYED RELEASE ORAL at 06:36

## 2017-11-12 RX ADMIN — Medication 1 DROP(S): at 06:36

## 2017-11-12 RX ADMIN — QUETIAPINE FUMARATE 50 MILLIGRAM(S): 200 TABLET, FILM COATED ORAL at 12:56

## 2017-11-12 RX ADMIN — DIVALPROEX SODIUM 250 MILLIGRAM(S): 500 TABLET, DELAYED RELEASE ORAL at 17:47

## 2017-11-12 RX ADMIN — PANTOPRAZOLE SODIUM 40 MILLIGRAM(S): 20 TABLET, DELAYED RELEASE ORAL at 06:36

## 2017-11-12 RX ADMIN — Medication 2 CAPSULE(S): at 12:57

## 2017-11-12 RX ADMIN — SERTRALINE 100 MILLIGRAM(S): 25 TABLET, FILM COATED ORAL at 12:57

## 2017-11-12 RX ADMIN — Medication 100 MILLIGRAM(S): at 17:47

## 2017-11-12 RX ADMIN — Medication 1 DROP(S): at 18:08

## 2017-11-12 RX ADMIN — Medication 2 CAPSULE(S): at 09:17

## 2017-11-12 RX ADMIN — Medication 50 MILLIGRAM(S): at 21:23

## 2017-11-12 RX ADMIN — AMLODIPINE BESYLATE 5 MILLIGRAM(S): 2.5 TABLET ORAL at 06:36

## 2017-11-12 RX ADMIN — POLYETHYLENE GLYCOL 3350 17 GRAM(S): 17 POWDER, FOR SOLUTION ORAL at 12:57

## 2017-11-12 RX ADMIN — Medication 100 MILLIGRAM(S): at 06:36

## 2017-11-12 RX ADMIN — QUETIAPINE FUMARATE 50 MILLIGRAM(S): 200 TABLET, FILM COATED ORAL at 21:23

## 2017-11-12 RX ADMIN — Medication 1 DROP(S): at 22:19

## 2017-11-12 RX ADMIN — Medication 9 MILLIGRAM(S): at 21:23

## 2017-11-12 RX ADMIN — QUETIAPINE FUMARATE 50 MILLIGRAM(S): 200 TABLET, FILM COATED ORAL at 06:36

## 2017-11-12 NOTE — PROGRESS NOTE ADULT - SUBJECTIVE AND OBJECTIVE BOX
Patient is a 84 y/o/f w/ extensive pmhx of Lyme's disease, babesiosis and ehrlichiosis admitted with AMS nov 6 in . On June 18, 2016, pt was diagnosed with the 3 tick borne diseases. Pt was in ICU, treated with quinine. hematoma.  Pt got better and then went [place] [facility]. Pt was brought home on July 16, 2016. Since pt has been home, pt has been fine with some cognitive deficits according to daughter. Pt sees Dr. Thayer for psychiatric care and was placed on Seroquel 25mg 5 times a day. This past weekend, pt's daughter states that pt is more agitated. Pt has also not been sleeping. Pt had EEG and labs done on Thursday to test for the tick borne illnesses. Pt had UA done and it was unremarkable. Pt also MRI done 3 weeks ago which was normal. Aide states that pt has been more difficult to feed. Dr. Thayer, psychiatrist. Pt had abrupt changes in her MS not consistent with worsening dementia; daughter thinks this is neurolyme.     In summary:   since the hospitalization patient with AMS with delirium and hallucinations. Very confused. tolerating po diet. had small BM overnight - refused in addition lactulose and enema. Labs and vitals reviewed. ID doctors note reviewed and his discussion's with [place] doctor. Hospitalist note reviewed.  Patient appears to be discharged yesterday.       Physical Exam:   ICU Vital Signs Last 24 Hrs  T(C): 36.9 (12 Nov 2017 05:09), Max: 36.9 (12 Nov 2017 05:09)  T(F): 98.5 (12 Nov 2017 05:09), Max: 98.5 (12 Nov 2017 05:09)  HR: 65 (12 Nov 2017 05:09) (65 - 67)  BP: 125/75 (12 Nov 2017 05:09) (110/69 - 125/75)  RR: 18 (12 Nov 2017 05:09) (18 - 18)  SpO2: 100% (12 Nov 2017 05:09) (99% - 100%)  Neuro: patient is confused  GI: had BM, abdomen mildly distended, non-tender, no rebound tenderness.     # Dementia / Encephalopathy  - as per ID concern about neurological Lyme disease was raised  -she had LP performed on 10/13: CSF Lyme PCR is negative  - ID discussed care with Dr. Jong Leung, a Lyme specialist doctor from [facility]; tel [phone]; he stated that he never evaluated this patient, but suggested that doxycycline 100 mg PO q12h for 3 weeks may be helpful  - start doxycycline 100 mg PO q12h x 3 weeks  - advised HCP to seek further medical attention with Dr. Leung    # Constipation  - miralax / colace / senna  - patient as per nursing refused enema and lactulose Patient is a 86 y/o/f w/ extensive pmhx of Lyme's disease, babesiosis and ehrlichiosis admitted with AMS nov 6 in . On June 18, 2016, pt was diagnosed with the 3 tick borne diseases. Pt was in ICU, treated with quinine. hematoma.  Pt got better and then went [facility]. Pt was brought home on July 16, 2016. Since pt has been home, pt has been fine with some cognitive deficits according to daughter. Pt sees Dr. Thayer for psychiatric care and was placed on Seroquel 25mg 5 times a day. This past weekend, pt's daughter states that pt is more agitated. Pt has also not been sleeping. Pt had EEG and labs done on Thursday to test for the tick borne illnesses. Pt had UA done and it was unremarkable. Pt also MRI done 3 weeks ago which was normal. Aide states that pt has been more difficult to feed. Dr. Thayer, psychiatrist. Pt had abrupt changes in her MS not consistent with worsening dementia; daughter thinks this is neurolyme.     In summary:   since the hospitalization patient with AMS with delirium and hallucinations. Very confused. tolerating po diet. had small BM overnight - refused in addition lactulose and enema. Labs and vitals reviewed. ID doctors note reviewed and his discussion's with [place] doctor. Hospitalist note reviewed.  Patient appears to be discharged yesterday.       Physical Exam:   ICU Vital Signs Last 24 Hrs  T(C): 36.9 (12 Nov 2017 05:09), Max: 36.9 (12 Nov 2017 05:09)  T(F): 98.5 (12 Nov 2017 05:09), Max: 98.5 (12 Nov 2017 05:09)  HR: 65 (12 Nov 2017 05:09) (65 - 67)  BP: 125/75 (12 Nov 2017 05:09) (110/69 - 125/75)  RR: 18 (12 Nov 2017 05:09) (18 - 18)  SpO2: 100% (12 Nov 2017 05:09) (99% - 100%)  Neuro: patient is confused  GI: had BM, abdomen mildly distended, non-tender, no rebound tenderness.     CT of the head:   Chronic small vessel ischemic changes. No parenchymal contusion,   hemorrhage or extra-axial collection.    No CT evidence of an acute territorial infarction.    # Dementia / Encephalopathy  - as per ID concern about neurological Lyme disease was raised  -she had LP performed on 10/13: CSF Lyme PCR is negative  - ID discussed care with Dr. Jong Leung, a Lyme specialist doctor from [facility]; tel [phone]; he stated that he never evaluated this patient, but suggested that doxycycline 100 mg PO q12h for 3 weeks may be helpful  - start doxycycline 100 mg PO q12h x 3 weeks  - advised HCP to seek further medical attention with Dr. Leung    # Constipation  - miralax / colace / senna  - patient as per nursing refused enema and lactulose

## 2017-11-13 RX ORDER — MULTIVIT WITH MIN/MFOLATE/K2 340-15/3 G
POWDER (GRAM) ORAL
Qty: 0 | Refills: 0 | Status: DISCONTINUED | OUTPATIENT
Start: 2017-11-13 | End: 2017-11-13

## 2017-11-13 RX ORDER — GLYCERIN ADULT
1 SUPPOSITORY, RECTAL RECTAL ONCE
Qty: 0 | Refills: 0 | Status: COMPLETED | OUTPATIENT
Start: 2017-11-13 | End: 2017-11-13

## 2017-11-13 RX ORDER — MULTIVIT WITH MIN/MFOLATE/K2 340-15/3 G
150 POWDER (GRAM) ORAL ONCE
Qty: 0 | Refills: 0 | Status: COMPLETED | OUTPATIENT
Start: 2017-11-13 | End: 2017-11-13

## 2017-11-13 RX ORDER — LANOLIN ALCOHOL/MO/W.PET/CERES
1 CREAM (GRAM) TOPICAL
Qty: 0 | Refills: 0 | COMMUNITY

## 2017-11-13 RX ORDER — POLYETHYLENE GLYCOL 3350 17 G/17G
17 POWDER, FOR SOLUTION ORAL
Qty: 5 | Refills: 0 | OUTPATIENT
Start: 2017-11-13 | End: 2017-11-18

## 2017-11-13 RX ORDER — MULTIVIT WITH MIN/MFOLATE/K2 340-15/3 G
150 POWDER (GRAM) ORAL DAILY
Qty: 0 | Refills: 0 | Status: DISCONTINUED | OUTPATIENT
Start: 2017-11-14 | End: 2017-11-13

## 2017-11-13 RX ORDER — TRAZODONE HCL 50 MG
0 TABLET ORAL
Qty: 0 | Refills: 0 | COMMUNITY

## 2017-11-13 RX ORDER — DIVALPROEX SODIUM 500 MG/1
1 TABLET, DELAYED RELEASE ORAL
Qty: 60 | Refills: 0 | OUTPATIENT
Start: 2017-11-13 | End: 2017-12-13

## 2017-11-13 RX ORDER — SERTRALINE 25 MG/1
1 TABLET, FILM COATED ORAL
Qty: 0 | Refills: 0 | COMMUNITY

## 2017-11-13 RX ADMIN — Medication 1 DROP(S): at 06:07

## 2017-11-13 RX ADMIN — Medication 150 MILLILITER(S): at 13:58

## 2017-11-13 RX ADMIN — SERTRALINE 100 MILLIGRAM(S): 25 TABLET, FILM COATED ORAL at 13:12

## 2017-11-13 RX ADMIN — Medication 1 DROP(S): at 13:12

## 2017-11-13 RX ADMIN — QUETIAPINE FUMARATE 50 MILLIGRAM(S): 200 TABLET, FILM COATED ORAL at 06:07

## 2017-11-13 RX ADMIN — Medication 2 CAPSULE(S): at 17:28

## 2017-11-13 RX ADMIN — Medication 1 SUPPOSITORY(S): at 13:58

## 2017-11-13 RX ADMIN — DIVALPROEX SODIUM 250 MILLIGRAM(S): 500 TABLET, DELAYED RELEASE ORAL at 06:07

## 2017-11-13 RX ADMIN — AMLODIPINE BESYLATE 5 MILLIGRAM(S): 2.5 TABLET ORAL at 06:07

## 2017-11-13 RX ADMIN — Medication 100 MILLIGRAM(S): at 06:07

## 2017-11-13 RX ADMIN — Medication 2 CAPSULE(S): at 13:12

## 2017-11-13 RX ADMIN — PANTOPRAZOLE SODIUM 40 MILLIGRAM(S): 20 TABLET, DELAYED RELEASE ORAL at 06:06

## 2017-11-13 RX ADMIN — POLYETHYLENE GLYCOL 3350 17 GRAM(S): 17 POWDER, FOR SOLUTION ORAL at 13:12

## 2017-11-13 RX ADMIN — Medication 200 MILLIGRAM(S): at 06:07

## 2017-11-13 RX ADMIN — Medication 1 DROP(S): at 17:27

## 2017-11-13 RX ADMIN — DIVALPROEX SODIUM 250 MILLIGRAM(S): 500 TABLET, DELAYED RELEASE ORAL at 17:28

## 2017-11-13 RX ADMIN — Medication 2 CAPSULE(S): at 09:10

## 2017-11-13 RX ADMIN — QUETIAPINE FUMARATE 50 MILLIGRAM(S): 200 TABLET, FILM COATED ORAL at 13:14

## 2017-11-13 RX ADMIN — Medication 100 MILLIGRAM(S): at 17:28

## 2017-11-13 NOTE — PROGRESS NOTE ADULT - SUBJECTIVE AND OBJECTIVE BOX
Patient is a 84 y/o/f w/ extensive pmhx of Lyme's disease, babesiosis and ehrlichiosis admitted with AMS nov 6 in . On June 18, 2016, pt was diagnosed with the 3 tick borne diseases. Pt was in ICU, treated with quinine. hematoma.  Pt got better and then went [place] [facility]. Pt was brought home on July 16, 2016. Since pt has been home, pt has been fine with some cognitive deficits according to daughter. Pt sees Dr. Thayer for psychiatric care and was placed on Seroquel 25mg 5 times a day. This past weekend, pt's daughter states that pt is more agitated. Pt has also not been sleeping. Pt had EEG and labs done on Thursday to test for the tick borne illnesses. Pt had UA done and it was unremarkable. Pt also MRI done 3 weeks ago which was normal. Aide states that pt has been more difficult to feed. Dr. Thayer, psychiatrist. Pt had abrupt changes in her MS not consistent with worsening dementia; daughter thinks this is neurolyme.     In summary:   since the hospitalization patient with AMS with delirium and hallucinations. Very confused. tolerating po diet. had small BM overnight - refused in addition lactulose and enema. Labs and vitals reviewed. ID doctors note reviewed and his discussion's with [place] doctor. Hospitalist note reviewed.  Patient appears to be discharged yesterday.      11/13: seen and eval. patient still not going to the bathroom. care discussed with patient's daughter in great extent. Will give mgcitrate / glycerin suppusitory and 30 min post will give enema. anticipated d.c today.      Physical Exam:   ICU Vital Signs Last 24 Hrs  T(C): 36.9 (13 Nov 2017 10:42), Max: 36.9 (13 Nov 2017 10:42)  T(F): 98.4 (13 Nov 2017 10:42), Max: 98.4 (13 Nov 2017 10:42)  HR: 62 (13 Nov 2017 10:42) (56 - 63)  BP: 123/56 (13 Nov 2017 10:42) (116/70 - 140/78)  RR: 18 (13 Nov 2017 10:42) (18 - 18)  SpO2: 100% (13 Nov 2017 10:42) (100% - 100%)    Neuro: patient is confused  GI: had BM, abdomen mildly distended, non-tender, no rebound tenderness.     CT of the head:   Chronic small vessel ischemic changes. No parenchymal contusion,   hemorrhage or extra-axial collection.    No CT evidence of an acute territorial infarction.    # Dementia / Encephalopathy  - as per ID concern about neurological Lyme disease was raised  -she had LP performed on 10/13: CSF Lyme PCR is negative  - ID discussed care with Dr. Jong Leung, a Lyme specialist doctor from [facility]; tel [phone]; he stated that he never evaluated this patient, but suggested that doxycycline 100 mg PO q12h for 3 weeks may be helpful  - start doxycycline 100 mg PO q12h x 3 weeks  - advised HCP to seek further medical attention with Dr. Leung    # Constipation  - miralax / colace / senna -> still no BM  - glycerin suppasitory / mgcitrate /  enema today  - care discussed with family Patient is a 84 y/o/f w/ extensive pmhx of Lyme's disease, babesiosis and ehrlichiosis admitted with AMS nov 6 in . On June 18, 2016, pt was diagnosed with the 3 tick borne diseases. Pt was in ICU, treated with quinine. hematoma.  Pt got better and then went [place] [facility]. Pt was brought home on July 16, 2016. Since pt has been home, pt has been fine with some cognitive deficits according to daughter. Pt sees Dr. Thayer for psychiatric care and was placed on Seroquel 25mg 5 times a day. This past weekend, pt's daughter states that pt is more agitated. Pt has also not been sleeping. Pt had EEG and labs done on Thursday to test for the tick borne illnesses. Pt had UA done and it was unremarkable. Pt also MRI done 3 weeks ago which was normal. Aide states that pt has been more difficult to feed. Dr. Thayer, psychiatrist. Pt had abrupt changes in her MS not consistent with worsening dementia; daughter thinks this is neurolyme.     In summary:   since the hospitalization patient with AMS with delirium and hallucinations. Very confused. tolerating po diet. had small BM overnight - refused in addition lactulose and enema. Labs and vitals reviewed. ID doctors note reviewed and his discussion's with [place] doctor. Hospitalist note reviewed.  Patient appears to be discharged yesterday.      11/13: seen and eval. patient had multiple small bms overnight and this am. family wants to take patient home. patient deconditioned but currently not complaining any HA, CP, SOB. No abdominal pain or discomfort. dc planning. care discussed with 2 daughters.      Physical Exam:   ICU Vital Signs Last 24 Hrs  T(C): 36.9 (13 Nov 2017 10:42), Max: 36.9 (13 Nov 2017 10:42)  T(F): 98.4 (13 Nov 2017 10:42), Max: 98.4 (13 Nov 2017 10:42)  HR: 62 (13 Nov 2017 10:42) (56 - 63)  BP: 123/56 (13 Nov 2017 10:42) (116/70 - 140/78)  RR: 18 (13 Nov 2017 10:42) (18 - 18)  SpO2: 100% (13 Nov 2017 10:42) (100% - 100%)      Neuro: patient is confused  GI: had BM, abdomen mildly distended, non-tender, no rebound tenderness.     CT of the head:   Chronic small vessel ischemic changes. No parenchymal contusion,   hemorrhage or extra-axial collection.    No CT evidence of an acute territorial infarction.    # Dementia / Encephalopathy  - as per ID concern about neurological Lyme disease was raised  -she had LP performed on 10/13: CSF Lyme PCR is negative  - ID discussed care with Dr. Jong Leung, a Lyme specialist doctor from [facility]; tel [phone]; he stated that he never evaluated this patient, but suggested that doxycycline 100 mg PO q12h for 3 weeks may be helpful  - start doxycycline 100 mg PO q12h x 3 weeks  - advised HCP to seek further medical attention with Dr. Leung    # Constipation  - miralax / colace / senna -> still no BM  - glycerin suppasitory / mgcitrate /  enema today  - care discussed with family

## 2017-11-13 NOTE — CHART NOTE - NSCHARTNOTEFT_GEN_A_CORE
medications of doxycycline and depakote sent to pharmacy again as per Dr Ornelas as previously failed

## 2017-11-13 NOTE — PROGRESS NOTE ADULT - PROVIDER SPECIALTY LIST ADULT
Hospitalist
Infectious Disease
Hospitalist
Hospitalist

## 2017-11-14 VITALS
RESPIRATION RATE: 18 BRPM | HEART RATE: 69 BPM | OXYGEN SATURATION: 100 % | SYSTOLIC BLOOD PRESSURE: 110 MMHG | DIASTOLIC BLOOD PRESSURE: 72 MMHG | TEMPERATURE: 98 F

## 2017-11-16 DIAGNOSIS — F03.90 UNSPECIFIED DEMENTIA WITHOUT BEHAVIORAL DISTURBANCE: ICD-10-CM

## 2017-11-16 DIAGNOSIS — G40.909 EPILEPSY, UNSPECIFIED, NOT INTRACTABLE, WITHOUT STATUS EPILEPTICUS: ICD-10-CM

## 2017-11-16 DIAGNOSIS — K59.00 CONSTIPATION, UNSPECIFIED: ICD-10-CM

## 2017-11-16 DIAGNOSIS — B60.0 BABESIOSIS: ICD-10-CM

## 2017-11-16 DIAGNOSIS — G93.40 ENCEPHALOPATHY, UNSPECIFIED: ICD-10-CM

## 2017-11-16 DIAGNOSIS — H91.90 UNSPECIFIED HEARING LOSS, UNSPECIFIED EAR: ICD-10-CM

## 2017-11-16 DIAGNOSIS — K21.9 GASTRO-ESOPHAGEAL REFLUX DISEASE WITHOUT ESOPHAGITIS: ICD-10-CM

## 2017-11-16 DIAGNOSIS — R41.0 DISORIENTATION, UNSPECIFIED: ICD-10-CM

## 2017-11-16 DIAGNOSIS — F05 DELIRIUM DUE TO KNOWN PHYSIOLOGICAL CONDITION: ICD-10-CM

## 2017-11-16 DIAGNOSIS — A69.22 OTHER NEUROLOGIC DISORDERS IN LYME DISEASE: ICD-10-CM

## 2017-11-16 DIAGNOSIS — Z96.651 PRESENCE OF RIGHT ARTIFICIAL KNEE JOINT: ICD-10-CM

## 2017-11-16 DIAGNOSIS — I10 ESSENTIAL (PRIMARY) HYPERTENSION: ICD-10-CM

## 2017-11-17 ENCOUNTER — APPOINTMENT (OUTPATIENT)
Dept: OPHTHALMOLOGY | Facility: CLINIC | Age: 82
End: 2017-11-17

## 2017-12-13 ENCOUNTER — APPOINTMENT (OUTPATIENT)
Dept: DERMATOLOGY | Facility: CLINIC | Age: 82
End: 2017-12-13

## 2017-12-13 LAB
MISCELLANEOUS TEST NAME: SIGNIFICANT CHANGE UP
MISCELLANEOUS, NORMALS: SIGNIFICANT CHANGE UP
MISCELLANEOUS, RESULT: NEGATIVE — SIGNIFICANT CHANGE UP
MISCELLANEOUS, RESULT: SIGNIFICANT CHANGE UP

## 2017-12-18 ENCOUNTER — APPOINTMENT (OUTPATIENT)
Dept: OPHTHALMOLOGY | Facility: CLINIC | Age: 82
End: 2017-12-18
Payer: MEDICARE

## 2017-12-18 PROCEDURE — 92012 INTRM OPH EXAM EST PATIENT: CPT

## 2018-02-02 ENCOUNTER — APPOINTMENT (OUTPATIENT)
Dept: OPHTHALMOLOGY | Facility: CLINIC | Age: 83
End: 2018-02-02

## 2018-03-14 NOTE — PATIENT PROFILE ADULT. - FUNCTIONAL LEVEL PRIOR: TRANSFERRING
1900-TRANSFER - IN REPORT:Verbal report received from [employer] RN(name) on Janine Cespedes  being received from PACU (unit) for routine progression of care  Report consisted of patients Situation, Background, Assessment and Recommendations(SBAR). Information from the following report(s) SBAR, Kardex, Intake/Output, MAR and Recent Results was reviewed with the receiving nurse. Opportunity for questions and clarification was provided. 0715-Bedside and Verbal shift change report given to Leigh Acosta (oncoming nurse) by Jennifer Barrera RN (offgoing nurse). Report included the following information SBAR, Kardex, Intake/Output, MAR and Recent Results. Laurita Leiva RN (3) assistive equipment and person

## 2018-03-23 ENCOUNTER — APPOINTMENT (OUTPATIENT)
Dept: OPHTHALMOLOGY | Facility: CLINIC | Age: 83
End: 2018-03-23
Payer: MEDICARE

## 2018-03-23 PROCEDURE — 92012 INTRM OPH EXAM EST PATIENT: CPT

## 2018-05-18 ENCOUNTER — APPOINTMENT (OUTPATIENT)
Dept: OPHTHALMOLOGY | Facility: CLINIC | Age: 83
End: 2018-05-18
Payer: MEDICARE

## 2018-05-18 DIAGNOSIS — T86.840 CORNEAL TRANSPLANT REJECTION: ICD-10-CM

## 2018-05-18 PROCEDURE — 92012 INTRM OPH EXAM EST PATIENT: CPT

## 2018-08-17 ENCOUNTER — APPOINTMENT (OUTPATIENT)
Dept: OPHTHALMOLOGY | Facility: CLINIC | Age: 83
End: 2018-08-17

## 2018-10-17 ENCOUNTER — EMERGENCY (EMERGENCY)
Facility: HOSPITAL | Age: 83
LOS: 0 days | Discharge: HOME | End: 2018-10-17
Attending: EMERGENCY MEDICINE | Admitting: EMERGENCY MEDICINE
Payer: MEDICARE

## 2018-10-17 VITALS
DIASTOLIC BLOOD PRESSURE: 77 MMHG | TEMPERATURE: 98 F | HEIGHT: 61 IN | RESPIRATION RATE: 15 BRPM | HEART RATE: 90 BPM | SYSTOLIC BLOOD PRESSURE: 148 MMHG | OXYGEN SATURATION: 100 % | WEIGHT: 100.09 LBS

## 2018-10-17 DIAGNOSIS — Z79.899 OTHER LONG TERM (CURRENT) DRUG THERAPY: ICD-10-CM

## 2018-10-17 DIAGNOSIS — I10 ESSENTIAL (PRIMARY) HYPERTENSION: ICD-10-CM

## 2018-10-17 DIAGNOSIS — I35.1 NONRHEUMATIC AORTIC (VALVE) INSUFFICIENCY: ICD-10-CM

## 2018-10-17 DIAGNOSIS — S01.81XA LACERATION WITHOUT FOREIGN BODY OF OTHER PART OF HEAD, INITIAL ENCOUNTER: ICD-10-CM

## 2018-10-17 DIAGNOSIS — F03.90 UNSPECIFIED DEMENTIA WITHOUT BEHAVIORAL DISTURBANCE: ICD-10-CM
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
## 2018-10-17 DIAGNOSIS — S09.93XA UNSPECIFIED INJURY OF FACE, INITIAL ENCOUNTER: ICD-10-CM

## 2018-10-17 PROCEDURE — 12011 RPR F/E/E/N/L/M 2.5 CM/<: CPT

## 2018-10-17 PROCEDURE — 70450 CT HEAD/BRAIN W/O DYE: CPT | Mod: 26

## 2018-10-17 PROCEDURE — 73564 X-RAY EXAM KNEE 4 OR MORE: CPT | Mod: 26,50

## 2018-10-17 PROCEDURE — 99284 EMERGENCY DEPT VISIT MOD MDM: CPT | Mod: 25

## 2018-10-17 PROCEDURE — 72125 CT NECK SPINE W/O DYE: CPT | Mod: 26

## 2018-10-17 RX ORDER — CEPHALEXIN 500 MG
1 CAPSULE ORAL
Qty: 28 | Refills: 0 | OUTPATIENT
Start: 2018-10-17 | End: 2018-10-23

## 2018-10-17 RX ORDER — CEPHALEXIN 500 MG
500 CAPSULE ORAL DAILY
Qty: 0 | Refills: 0 | Status: DISCONTINUED | OUTPATIENT
Start: 2018-10-17 | End: 2018-10-17

## 2018-10-17 RX ORDER — TETANUS TOXOID, REDUCED DIPHTHERIA TOXOID AND ACELLULAR PERTUSSIS VACCINE, ADSORBED 5; 2.5; 8; 8; 2.5 [IU]/.5ML; [IU]/.5ML; UG/.5ML; UG/.5ML; UG/.5ML
0.5 SUSPENSION INTRAMUSCULAR ONCE
Qty: 0 | Refills: 0 | Status: COMPLETED | OUTPATIENT
Start: 2018-10-17 | End: 2018-10-17

## 2018-10-17 RX ADMIN — Medication 500 MILLIGRAM(S): at 19:30

## 2018-10-17 RX ADMIN — TETANUS TOXOID, REDUCED DIPHTHERIA TOXOID AND ACELLULAR PERTUSSIS VACCINE, ADSORBED 0.5 MILLILITER(S): 5; 2.5; 8; 8; 2.5 SUSPENSION INTRAMUSCULAR at 18:03

## 2018-10-17 NOTE — ED ADULT NURSE NOTE - INTERVENTIONS DEFINITIONS
Stretcher in lowest position, wheels locked, appropriate side rails in place/[place] to call system/Instruct patient to call for assistance/Call bell, personal items and telephone within reach

## 2018-10-17 NOTE — ED ADULT TRIAGE NOTE - CHIEF COMPLAINT QUOTE
fall going into the house, witnessed by aide. laceration to left eyebrow. hx of dementia. no blood thinners.

## 2018-10-17 NOTE — ED ADULT NURSE NOTE - OBJECTIVE STATEMENT
patient was entering her house, tripped and fell over her walker causing a laceration to her left eyebrow and skin tear to her left knee

## 2018-10-17 NOTE — ED PROVIDER NOTE - NS_ ATTENDINGSCRIBEDETAILS _ED_A_ED_FT
I, Sim De La Fuente MD,  performed the initial face to face bedside interview with this patient regarding history of present illness, review of symptoms and relevant past medical, social and family history.  I completed an independent physical examination.    The history, relevant review of systems, past medical and surgical history, medical decision making, and physical examination was documented by the scribe in my presence and I attest to the accuracy of the documentation.

## 2018-10-17 NOTE — ED PROVIDER NOTE - OBJECTIVE STATEMENT
87 y/o female with PMHx of aortic insufficiency, HTN presents to the ED s/p mechanical fall today now c/o laceration to left eyebrow and pain to left knee. Pt states she tripped going through her front door. No LOC or any other sx at this time. No anticoagulants. Tdap not UTD. HPI obtained from family as pt has a hx of dementia.

## 2018-10-17 NOTE — ED PROVIDER NOTE - SKIN, MLM
Skin normal color for race, warm, dry. No evidence of rash. +laceration to left eyebrow, minimal active bleeding. +abrasion to left knee.

## 2018-10-24 ENCOUNTER — EMERGENCY (EMERGENCY)
Facility: HOSPITAL | Age: 83
LOS: 0 days | Discharge: HOME | End: 2018-10-24
Attending: STUDENT IN AN ORGANIZED HEALTH CARE EDUCATION/TRAINING PROGRAM | Admitting: STUDENT IN AN ORGANIZED HEALTH CARE EDUCATION/TRAINING PROGRAM
Payer: MEDICARE

## 2018-10-24 VITALS
RESPIRATION RATE: 18 BRPM | TEMPERATURE: 98 F | OXYGEN SATURATION: 100 % | DIASTOLIC BLOOD PRESSURE: 90 MMHG | HEART RATE: 66 BPM | SYSTOLIC BLOOD PRESSURE: 135 MMHG

## 2018-10-24 VITALS — WEIGHT: 111.99 LBS | HEIGHT: 65 IN

## 2018-10-24 PROCEDURE — 99282 EMERGENCY DEPT VISIT SF MDM: CPT

## 2018-10-24 NOTE — ED STATDOCS - OBJECTIVE STATEMENT
87 y/o female with PMHx of aortic insufficiency, HTN presents to the ED c/o suture removal. Pt has 8 stitches. Pt had a fall 6 days ago where she tripped going through her front door. Pt had a laceration to her left eyebrow. Pt does not take any anticoagulates. 85 y/o female with PMHx of aortic insufficiency, HTN presents to the ED c/o suture removal. Pt has 8 stitches. Pt had a fall 6 days ago where she tripped going through her front door. Pt had a laceration to her left eyebrow. Pt does not take any anticoagulants; no complaints; here for suture removal.

## 2018-10-24 NOTE — ED ADULT NURSE NOTE - OBJECTIVE STATEMENT
Pt was evaluated and discharged by provider. See provider note for assessment. Discharge paperwork completed by pt and discharged at this time.

## 2018-10-24 NOTE — ED STATDOCS - ATTENDING CONTRIBUTION TO CARE
I, Idania Cobb DO,  performed the initial face to face bedside interview with this patient regarding history of present illness, review of symptoms and relevant past medical, social and family history.  I completed an independent physical examination.  I was the initial provider who evaluated this patient. I have signed out the follow up of any pending tests (i.e. labs, radiological studies) to the ACP.  I have communicated the patient’s plan of care and disposition with the ACP.  The history, relevant review of systems, past medical and surgical history, medical decision making, and physical examination was documented by the scribe in my presence and I attest to the accuracy of the documentation.

## 2018-10-25 DIAGNOSIS — M12.9 ARTHROPATHY, UNSPECIFIED: ICD-10-CM

## 2018-10-25 DIAGNOSIS — S01.112D LACERATION WITHOUT FOREIGN BODY OF LEFT EYELID AND PERIOCULAR AREA, SUBSEQUENT ENCOUNTER: ICD-10-CM

## 2018-10-25 DIAGNOSIS — Z90.89 ACQUIRED ABSENCE OF OTHER ORGANS: ICD-10-CM

## 2018-10-25 DIAGNOSIS — Z96.651 PRESENCE OF RIGHT ARTIFICIAL KNEE JOINT: ICD-10-CM

## 2018-10-25 DIAGNOSIS — I35.1 NONRHEUMATIC AORTIC (VALVE) INSUFFICIENCY: ICD-10-CM
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
## 2018-10-25 DIAGNOSIS — Z94.7 CORNEAL TRANSPLANT STATUS: ICD-10-CM

## 2018-10-25 DIAGNOSIS — I10 ESSENTIAL (PRIMARY) HYPERTENSION: ICD-10-CM

## 2019-01-28 NOTE — PHYSICAL THERAPY INITIAL EVALUATION ADULT - ADL SKILLS, REHAB EVAL
needed assist
no loss of consciousness, no gait abnormality, no headache, no sensory deficits, and no weakness.

## 2019-02-08 ENCOUNTER — APPOINTMENT (OUTPATIENT)
Dept: OPHTHALMOLOGY | Facility: CLINIC | Age: 84
End: 2019-02-08
Payer: MEDICARE

## 2019-02-08 DIAGNOSIS — H18.232 SECONDARY CORNEAL EDEMA, LEFT EYE: ICD-10-CM

## 2019-02-08 DIAGNOSIS — Z96.1 PRESENCE OF INTRAOCULAR LENS: ICD-10-CM

## 2019-02-08 DIAGNOSIS — Z94.7 CORNEAL TRANSPLANT STATUS: ICD-10-CM

## 2019-02-08 PROCEDURE — 92014 COMPRE OPH EXAM EST PT 1/>: CPT

## 2019-08-16 ENCOUNTER — APPOINTMENT (OUTPATIENT)
Dept: OPHTHALMOLOGY | Facility: CLINIC | Age: 84
End: 2019-08-16
Payer: MEDICARE

## 2019-08-16 ENCOUNTER — NON-APPOINTMENT (OUTPATIENT)
Age: 84
End: 2019-08-16

## 2019-08-16 PROCEDURE — 92012 INTRM OPH EXAM EST PATIENT: CPT

## 2020-02-21 ENCOUNTER — APPOINTMENT (OUTPATIENT)
Dept: OPHTHALMOLOGY | Facility: CLINIC | Age: 85
End: 2020-02-21
Payer: MEDICARE

## 2020-02-21 ENCOUNTER — NON-APPOINTMENT (OUTPATIENT)
Age: 85
End: 2020-02-21

## 2020-02-21 PROCEDURE — 92014 COMPRE OPH EXAM EST PT 1/>: CPT

## 2020-08-18 NOTE — INPATIENT CERTIFICATION FOR MEDICARE PATIENTS - THE STATUS OF COMORBIDITIES.
The skin at the access site was anesthetized. A left chest cut down was performed and surgical access was obtained.  2. The status of comorbities. (See ED/admit documents)

## 2020-08-21 ENCOUNTER — APPOINTMENT (OUTPATIENT)
Dept: OPHTHALMOLOGY | Facility: CLINIC | Age: 85
End: 2020-08-21

## 2020-12-10 ENCOUNTER — APPOINTMENT (OUTPATIENT)
Dept: OPHTHALMOLOGY | Facility: CLINIC | Age: 85
End: 2020-12-10
Payer: MEDICARE

## 2020-12-10 ENCOUNTER — NON-APPOINTMENT (OUTPATIENT)
Age: 85
End: 2020-12-10

## 2020-12-10 PROCEDURE — 99213 OFFICE O/P EST LOW 20 MIN: CPT | Mod: 95

## 2020-12-10 PROCEDURE — 99202 OFFICE O/P NEW SF 15 MIN: CPT | Mod: 95

## 2021-10-20 ENCOUNTER — NON-APPOINTMENT (OUTPATIENT)
Age: 86
End: 2021-10-20

## 2021-10-20 ENCOUNTER — APPOINTMENT (OUTPATIENT)
Dept: OPHTHALMOLOGY | Facility: CLINIC | Age: 86
End: 2021-10-20
Payer: MEDICARE

## 2021-10-20 PROCEDURE — 92014 COMPRE OPH EXAM EST PT 1/>: CPT

## 2022-06-28 NOTE — ED STATDOCS - CHPI ED SEVERITY
MILD [Discharge] : no discharge [Fever] : no fever [Earache] : no earache [Sore Throat] : no sore throat [Chest Pain] : no chest pain [Palpitations] : no palpitations [Shortness Of Breath] : no shortness of breath [Cough] : no cough [Abdominal Pain] : no abdominal pain [Headache] : no headache

## 2022-09-14 NOTE — ED ADULT NURSE NOTE - TOBACCO SCREENING (FROM THE ASSIST)
Date form returned from provider: 09/12/2022   Comments Form scanned to encounter.  Per patients request form sent to   [facility]  Fax # [phone]               Statement Selected

## 2023-02-24 ENCOUNTER — APPOINTMENT (OUTPATIENT)
Dept: OPHTHALMOLOGY | Facility: CLINIC | Age: 88
End: 2023-02-24
Payer: MEDICARE

## 2023-02-24 ENCOUNTER — NON-APPOINTMENT (OUTPATIENT)
Age: 88
End: 2023-02-24

## 2023-02-24 PROCEDURE — 92014 COMPRE OPH EXAM EST PT 1/>: CPT

## 2023-08-11 ENCOUNTER — NON-APPOINTMENT (OUTPATIENT)
Age: 88
End: 2023-08-11

## 2023-08-11 ENCOUNTER — APPOINTMENT (OUTPATIENT)
Dept: OPHTHALMOLOGY | Facility: CLINIC | Age: 88
End: 2023-08-11
Payer: MEDICARE

## 2023-08-11 PROCEDURE — 92012 INTRM OPH EXAM EST PATIENT: CPT

## 2023-12-20 NOTE — BEHAVIORAL HEALTH ASSESSMENT NOTE - NSBHCONSULTWORKUP_PSY_A_CORE
Intubation    Date/Time: 12/20/2023 12:02 PM    Performed by: Andrés Flores CRNA  Authorized by: Marcela Ross MD    Intubation:     Induction:  Intravenous    Intubated:  Postinduction    Mask Ventilation:  Easy mask    Attempts:  1    Attempted By:  CRNA    Method of Intubation:  Video laryngoscopy    Blade:  [place] 3    Laryngeal View Grade: Grade I - full view of cords      Difficult Airway Encountered?: No      Complications:  None    Airway Device:  Oral endotracheal tube    Airway Device Size:  7.0    Style/Cuff Inflation:  Cuffed    Inflation Amount (mL):  5    Tube secured:  21    Secured at:  The lips    Placement Verified By:  Capnometry    Complicating Factors:  None    Findings Post-Intubation:  BS equal bilateral and atraumatic/condition of teeth unchanged       yes

## 2024-03-29 ENCOUNTER — NON-APPOINTMENT (OUTPATIENT)
Age: 89
End: 2024-03-29

## 2024-03-29 ENCOUNTER — APPOINTMENT (OUTPATIENT)
Dept: OPHTHALMOLOGY | Facility: CLINIC | Age: 89
End: 2024-03-29
Payer: MEDICARE

## 2024-03-29 PROCEDURE — 92014 COMPRE OPH EXAM EST PT 1/>: CPT

## 2024-09-27 ENCOUNTER — APPOINTMENT (OUTPATIENT)
Dept: OPHTHALMOLOGY | Facility: CLINIC | Age: 89
End: 2024-09-27
Payer: MEDICARE

## 2024-09-27 ENCOUNTER — NON-APPOINTMENT (OUTPATIENT)
Age: 89
End: 2024-09-27

## 2024-09-27 PROCEDURE — 92012 INTRM OPH EXAM EST PATIENT: CPT

## 2025-03-28 ENCOUNTER — NON-APPOINTMENT (OUTPATIENT)
Age: 89
End: 2025-03-28

## 2025-03-28 ENCOUNTER — APPOINTMENT (OUTPATIENT)
Dept: OPHTHALMOLOGY | Facility: CLINIC | Age: 89
End: 2025-03-28
Payer: MEDICARE

## 2025-03-28 PROCEDURE — 76514 ECHO EXAM OF EYE THICKNESS: CPT

## 2025-03-28 PROCEDURE — 92012 INTRM OPH EXAM EST PATIENT: CPT

## 2025-07-08 NOTE — ED STATDOCS - MEDICAL DECISION MAKING DETAILS
independent 87 y/o female with suture removal. 85 y/o female with suture removal; suture removal; f/u with pmd as instructed.

## 2025-09-17 ENCOUNTER — NON-APPOINTMENT (OUTPATIENT)
Age: 89
End: 2025-09-17

## 2025-09-19 ENCOUNTER — NON-APPOINTMENT (OUTPATIENT)
Age: 89
End: 2025-09-19

## 2025-09-19 ENCOUNTER — APPOINTMENT (OUTPATIENT)
Dept: OPHTHALMOLOGY | Facility: CLINIC | Age: 89
End: 2025-09-19
Payer: MEDICARE

## 2025-09-19 PROCEDURE — 92014 COMPRE OPH EXAM EST PT 1/>: CPT
